# Patient Record
Sex: FEMALE | Race: WHITE | NOT HISPANIC OR LATINO | Employment: OTHER | URBAN - METROPOLITAN AREA
[De-identification: names, ages, dates, MRNs, and addresses within clinical notes are randomized per-mention and may not be internally consistent; named-entity substitution may affect disease eponyms.]

---

## 2022-06-17 ENCOUNTER — OFFICE VISIT (OUTPATIENT)
Dept: ORTHOPEDIC SURGERY | Facility: CLINIC | Age: 62
End: 2022-06-17

## 2022-06-17 VITALS — OXYGEN SATURATION: 98 % | HEART RATE: 97 BPM | HEIGHT: 63 IN | WEIGHT: 180 LBS | BODY MASS INDEX: 31.89 KG/M2

## 2022-06-17 DIAGNOSIS — M17.12 PRIMARY OSTEOARTHRITIS OF LEFT KNEE: Primary | ICD-10-CM

## 2022-06-17 PROCEDURE — 20610 DRAIN/INJ JOINT/BURSA W/O US: CPT | Performed by: ORTHOPAEDIC SURGERY

## 2022-06-17 PROCEDURE — 99203 OFFICE O/P NEW LOW 30 MIN: CPT | Performed by: ORTHOPAEDIC SURGERY

## 2022-06-17 RX ORDER — DICLOFENAC SODIUM 75 MG/1
75 TABLET, DELAYED RELEASE ORAL 2 TIMES DAILY
Qty: 60 TABLET | Refills: 2 | Status: SHIPPED | OUTPATIENT
Start: 2022-06-17 | End: 2022-08-22

## 2022-06-17 RX ORDER — AMOXICILLIN 875 MG/1
TABLET, COATED ORAL
COMMUNITY
Start: 2022-06-16 | End: 2023-02-14

## 2022-06-17 RX ADMIN — LIDOCAINE HYDROCHLORIDE 5 ML: 10 INJECTION, SOLUTION INFILTRATION; PERINEURAL at 10:18

## 2022-06-17 RX ADMIN — TRIAMCINOLONE ACETONIDE 40 MG: 40 INJECTION, SUSPENSION INTRA-ARTICULAR; INTRAMUSCULAR at 10:18

## 2022-06-17 NOTE — PROGRESS NOTES
"Chief Complaint  Initial Evaluation of the Left Knee     Subjective      Patricia Whyte presents to [facility] for evaluation of the left knee. The patient reports left knee pain since April without injury or trauma. She locates pain to the medial knee but she has had pain to the lateral knee. She admits to swelling, popping and grinding. She uses ice at night to be able to sleep. She has no other complaints.     No Known Allergies     Social History     Socioeconomic History   • Marital status:    Tobacco Use   • Smoking status: Current Every Day Smoker     Packs/day: 0.50   • Smokeless tobacco: Never Used        Review of Systems     Objective   Vital Signs:   Pulse 97   Ht 160 cm (63\")   Wt 81.6 kg (180 lb)   SpO2 98%   BMI 31.89 kg/m²       Physical Exam  Constitutional:       Appearance: Normal appearance. The patient is well-developed and normal weight.   HENT:      Head: Normocephalic.      Right Ear: Hearing and external ear normal.      Left Ear: Hearing and external ear normal.      Nose: Nose normal.   Eyes:      Conjunctiva/sclera: Conjunctivae normal.   Cardiovascular:      Rate and Rhythm: Normal rate.   Pulmonary:      Effort: Pulmonary effort is normal.      Breath sounds: No wheezing or rales.   Abdominal:      Palpations: Abdomen is soft.      Tenderness: There is no abdominal tenderness.   Musculoskeletal:      Cervical back: Normal range of motion.   Skin:     Findings: No rash.   Neurological:      Mental Status: The patient is alert and oriented to person, place, and time.   Psychiatric:         Mood and Affect: Mood and affect normal.         Judgment: Judgment normal.       Ortho Exam      Left knee- Stable to varus/valgus stress. Stable to anterior/posterior drawer. Tender to medial joint line. Mild swelling. Small effusion. Neurovascularly intact. ROM -5 to 130 degrees. Negative Lachman's. Mild pain with Brigido's. Positive EHL, FHL, GS and TA. " Sensation intact to all 5 nerves of the foot. Positive pulses. Good strength to hamstrings, quadriceps, dorsiflexors, and plantar flexors.  Calf soft.     Left knee: L knee  Date/Time: 6/17/2022 10:18 AM  Consent given by: patient  Site marked: site marked  Timeout: Immediately prior to procedure a time out was called to verify the correct patient, procedure, equipment, support staff and site/side marked as required   Supporting Documentation  Indications: pain   Procedure Details  Location: knee - L knee  Needle gauge: 21G.  Medications administered: 5 mL lidocaine 1 %; 40 mg triamcinolone acetonide 40 MG/ML  Patient tolerance: patient tolerated the procedure well with no immediate complications            Imaging Results (Most Recent)     None           Result Review :       XR Knee 3 View Left    Result Date: 5/18/2022  Narrative: PROCEDURE: XR KNEE 3 VW LEFT  COMPARISON: [facility], , KNEE 3 VIEWS LT, 6/14/2008, 15:35.  INDICATIONS: Left knee pain, swelling, popping X 5 weeks, NKI.  FINDINGS:  BONES: Moderate tricompartment osteoarthritic degenerative changes are present.  No fractures or acute osseous abnormalities are identified. SOFT TISSUES: Negative.  No visible soft tissue swelling.  EFFUSION: Small suprapatellar effusion. OTHER: Negative.       Impression:  Moderate tricompartment degenerative change.  Small suprapatellar effusion.      RAHEL CRUZ MD       Electronically Signed and Approved By: RAHEL CRUZ MD on 5/18/2022 at 15:37                      Assessment and Plan     Diagnoses and all orders for this visit:    1. Primary osteoarthritis of left knee (Primary)        Discussed the treatment plan with the patient.  I reviewed the x-rays that were previously obtained. Plan to continue ibuprofen. Discussed the risks and benefits of a left knee steroid injection. The patient expressed understanding and wished to proceed. She tolerated the injection well.     Call or return if  worsening symptoms.    Follow Up     6 weeks      Patient was given instructions and counseling regarding her condition or for health maintenance advice. Please see specific information pulled into the AVS if appropriate.     Scribed for Jhonathan Morales MD by Radha Almendarez.  06/17/22   10:03 EDT    I have personally performed the services described in this document as scribed by the above individual and it is both accurate and complete. Jhonathan Morales MD 06/20/22

## 2022-06-20 RX ORDER — LIDOCAINE HYDROCHLORIDE 10 MG/ML
5 INJECTION, SOLUTION INFILTRATION; PERINEURAL
Status: COMPLETED | OUTPATIENT
Start: 2022-06-17 | End: 2022-06-17

## 2022-06-20 RX ORDER — TRIAMCINOLONE ACETONIDE 40 MG/ML
40 INJECTION, SUSPENSION INTRA-ARTICULAR; INTRAMUSCULAR
Status: COMPLETED | OUTPATIENT
Start: 2022-06-17 | End: 2022-06-17

## 2022-07-29 ENCOUNTER — OFFICE VISIT (OUTPATIENT)
Dept: ORTHOPEDIC SURGERY | Facility: CLINIC | Age: 62
End: 2022-07-29

## 2022-07-29 VITALS — OXYGEN SATURATION: 97 % | HEIGHT: 63 IN | WEIGHT: 183 LBS | HEART RATE: 103 BPM | BODY MASS INDEX: 32.43 KG/M2

## 2022-07-29 DIAGNOSIS — M17.12 PRIMARY OSTEOARTHRITIS OF LEFT KNEE: Primary | ICD-10-CM

## 2022-07-29 PROCEDURE — 99213 OFFICE O/P EST LOW 20 MIN: CPT | Performed by: PHYSICIAN ASSISTANT

## 2022-07-29 NOTE — PROGRESS NOTES
"Chief Complaint  Pain and Follow-up of the Left Knee    Subjective          Patricia Whyte is a 62 y.o. female  presents to [facility] for   History of Present Illness      Patient presents for follow-up evaluation of left knee pain, left knee osteoarthritis.  She was seen by Dr. Morales on 6/17/2022 he gave her a left knee steroid injection which she states did help she has also been taking diclofenac with relief.  She works a job where she is on her feet all day and states that her knee is not 100% better but she states the injection has helped a lot.  She has tried other treatment options including medications, bracing and the injection plus diclofenac have helped her the most.  No new complaints today.      No Known Allergies     Social History     Socioeconomic History   • Marital status:    Tobacco Use   • Smoking status: Current Every Day Smoker     Packs/day: 0.50   • Smokeless tobacco: Never Used   Vaping Use   • Vaping Use: Never used        REVIEW OF SYSTEMS    Constitutional: Denies fevers, chills, weight loss  Cardiovascular: Denies chest pain, shortness of breath  Skin: Denies rashes, acute skin changes  Neurologic: Denies headache, loss of consciousness  MSK: Left knee pain      Objective   Vital Signs:   Pulse 103   Ht 160 cm (63\")   Wt 83 kg (183 lb)   SpO2 97%   BMI 32.42 kg/m²     Body mass index is 32.42 kg/m².    Physical Exam    Left knee: Skin is intact, no erythema, no ecchymosis, no swelling, no signs of infection, full extension, flexion 120, stable to varus/valgus stress, stable anterior/posterior drawer, nontender to palpation, no pain with range of motion, 5 out of 5 strength.    Procedures    Imaging Results (Most Recent)     None           Result Review :   The following data was reviewed by: NICOLÁS Holloway on 07/29/2022:               Assessment and Plan    Diagnoses and all orders for this visit:    1. Primary " osteoarthritis of left knee (Primary)        Discussed diagnosis and treatment options with the patient patient was advised to continue activity as tolerated we discussed future injections which she would like to follow-up in 6 weeks we are going to seek approval for Zilretta injection for the left knee in 6 weeks for, if not we will do regular steroid injections.  Patient agreed with plan.  She will continue diclofenac.    Call or return if worsening symptoms.    Follow Up   Return in about 6 weeks (around 9/9/2022) for Recheck.  Patient was given instructions and counseling regarding her condition or for health maintenance advice. Please see specific information pulled into the AVS if appropriate.

## 2022-08-22 DIAGNOSIS — M17.12 PRIMARY OSTEOARTHRITIS OF LEFT KNEE: ICD-10-CM

## 2022-08-22 RX ORDER — DICLOFENAC SODIUM 75 MG/1
TABLET, DELAYED RELEASE ORAL
Qty: 60 TABLET | Refills: 2 | Status: SHIPPED | OUTPATIENT
Start: 2022-08-22 | End: 2022-09-09 | Stop reason: SDUPTHER

## 2022-09-09 ENCOUNTER — TELEPHONE (OUTPATIENT)
Dept: ORTHOPEDIC SURGERY | Facility: CLINIC | Age: 62
End: 2022-09-09

## 2022-09-09 ENCOUNTER — OFFICE VISIT (OUTPATIENT)
Dept: ORTHOPEDIC SURGERY | Facility: CLINIC | Age: 62
End: 2022-09-09

## 2022-09-09 VITALS — OXYGEN SATURATION: 95 % | WEIGHT: 185.4 LBS | BODY MASS INDEX: 32.85 KG/M2 | HEIGHT: 63 IN | HEART RATE: 92 BPM

## 2022-09-09 DIAGNOSIS — M17.12 PRIMARY OSTEOARTHRITIS OF LEFT KNEE: Primary | ICD-10-CM

## 2022-09-09 PROCEDURE — 20610 DRAIN/INJ JOINT/BURSA W/O US: CPT | Performed by: PHYSICIAN ASSISTANT

## 2022-09-09 RX ORDER — TRIAMCINOLONE ACETONIDE 40 MG/ML
40 INJECTION, SUSPENSION INTRA-ARTICULAR; INTRAMUSCULAR
Status: COMPLETED | OUTPATIENT
Start: 2022-09-09 | End: 2022-09-09

## 2022-09-09 RX ORDER — LIDOCAINE HYDROCHLORIDE 10 MG/ML
5 INJECTION, SOLUTION INFILTRATION; PERINEURAL
Status: COMPLETED | OUTPATIENT
Start: 2022-09-09 | End: 2022-09-09

## 2022-09-09 RX ORDER — DICLOFENAC SODIUM 75 MG/1
75 TABLET, DELAYED RELEASE ORAL 2 TIMES DAILY
Qty: 60 TABLET | Refills: 2 | Status: SHIPPED | OUTPATIENT
Start: 2022-09-09 | End: 2023-03-16 | Stop reason: SDUPTHER

## 2022-09-09 RX ADMIN — TRIAMCINOLONE ACETONIDE 40 MG: 40 INJECTION, SUSPENSION INTRA-ARTICULAR; INTRAMUSCULAR at 11:21

## 2022-09-09 RX ADMIN — LIDOCAINE HYDROCHLORIDE 5 ML: 10 INJECTION, SOLUTION INFILTRATION; PERINEURAL at 11:21

## 2022-09-09 NOTE — TELEPHONE ENCOUNTER
Patient saw Von today. Needs a PA for Left knee zilretta in 3 months.   
Will work on prior auth, next appt in 3 months  
Statement Selected

## 2022-12-15 ENCOUNTER — OFFICE VISIT (OUTPATIENT)
Dept: ORTHOPEDIC SURGERY | Facility: CLINIC | Age: 62
End: 2022-12-15

## 2022-12-15 VITALS — HEART RATE: 91 BPM | HEIGHT: 63 IN | OXYGEN SATURATION: 95 % | BODY MASS INDEX: 32.43 KG/M2 | WEIGHT: 183 LBS

## 2022-12-15 DIAGNOSIS — M17.12 PRIMARY OSTEOARTHRITIS OF LEFT KNEE: Primary | ICD-10-CM

## 2022-12-15 PROCEDURE — 20610 DRAIN/INJ JOINT/BURSA W/O US: CPT | Performed by: PHYSICIAN ASSISTANT

## 2022-12-15 RX ORDER — HYDROCODONE BITARTRATE AND ACETAMINOPHEN 5; 325 MG/1; MG/1
TABLET ORAL
COMMUNITY
Start: 2022-12-14 | End: 2023-02-14

## 2022-12-15 RX ORDER — LIDOCAINE HYDROCHLORIDE 10 MG/ML
5 INJECTION, SOLUTION INFILTRATION; PERINEURAL
Status: COMPLETED | OUTPATIENT
Start: 2022-12-15 | End: 2022-12-15

## 2022-12-15 RX ADMIN — LIDOCAINE HYDROCHLORIDE 5 ML: 10 INJECTION, SOLUTION INFILTRATION; PERINEURAL at 10:29

## 2022-12-15 NOTE — PROGRESS NOTES
"Chief Complaint  Pain and Follow-up of the Left Knee    Subjective          Patricia Whyte is a 62 y.o. female  presents to [facility] for   History of Present Illness      For follow-up evaluation of left knee pain, left knee osteoarthritis.  She points the anterior knee in the medial and lateral knee as her areas of pain states pain is similar to past episodes she takes diclofenac as needed and Tylenol as needed, she states the injections she has been receiving have been helping her knee pain she states they are some days worse than others but in general she is happy with her results and would like to continue conservative treatment with left knee steroid injection today.      No Known Allergies     Social History     Socioeconomic History   • Marital status:    Tobacco Use   • Smoking status: Every Day     Packs/day: 0.50     Types: Cigarettes   • Smokeless tobacco: Never   Vaping Use   • Vaping Use: Never used        REVIEW OF SYSTEMS    Constitutional: Denies fevers, chills, weight loss  Cardiovascular: Denies chest pain, shortness of breath  Skin: Denies rashes, acute skin changes  Neurologic: Denies headache, loss of consciousness  MSK: Left knee pain      Objective   Vital Signs:   Pulse 91   Ht 160 cm (63\")   Wt 83 kg (183 lb)   SpO2 95%   BMI 32.42 kg/m²     Body mass index is 32.42 kg/m².    Physical Exam    Left knee: Skin is intact, no erythema, ecchymosis, no swelling, no effusion, no signs of infection, full extension, flexion 120, stable anterior/posterior drawer, stable to varus/valgus stress.  Nontender to palpation, no pain with range of motion.    Large Joint Arthrocentesis: L knee  Date/Time: 12/15/2022 10:29 AM  Consent given by: patient  Site marked: site marked  Timeout: Immediately prior to procedure a time out was called to verify the correct patient, procedure, equipment, support staff and site/side marked as required   Supporting " Documentation  Indications: pain   Procedure Details  Location: knee - L knee  Needle gauge: 21 G.  Medications administered: 32 mg Triamcinolone Acetonide 32 MG; 5 mL lidocaine 1 %  Patient tolerance: patient tolerated the procedure well with no immediate complications          Imaging Results (Most Recent)     None           Result Review :   The following data was reviewed by: NICOLÁS Holloway on 12/15/2022:               Assessment and Plan    Diagnoses and all orders for this visit:    1. Primary osteoarthritis of left knee (Primary)    Other orders  -     Large Joint Arthrocentesis: L knee        Discussed diagnosis and treatment options with the patient she elected to have left knee steroid injection today which she tolerated well follow-up in 3 months.    Call or return if worsening symptoms.    Follow Up   Return in about 3 months (around 3/15/2023) for Recheck.  Patient was given instructions and counseling regarding her condition or for health maintenance advice. Please see specific information pulled into the AVS if appropriate.

## 2023-03-16 ENCOUNTER — OFFICE VISIT (OUTPATIENT)
Dept: ORTHOPEDIC SURGERY | Facility: CLINIC | Age: 63
End: 2023-03-16
Payer: COMMERCIAL

## 2023-03-16 VITALS — HEIGHT: 63 IN | OXYGEN SATURATION: 97 % | HEART RATE: 101 BPM | WEIGHT: 180 LBS | BODY MASS INDEX: 31.89 KG/M2

## 2023-03-16 DIAGNOSIS — M17.12 PRIMARY OSTEOARTHRITIS OF LEFT KNEE: Primary | ICD-10-CM

## 2023-03-16 PROCEDURE — 20610 DRAIN/INJ JOINT/BURSA W/O US: CPT | Performed by: PHYSICIAN ASSISTANT

## 2023-03-16 RX ORDER — METHYLPREDNISOLONE ACETATE 80 MG/ML
80 INJECTION, SUSPENSION INTRA-ARTICULAR; INTRALESIONAL; INTRAMUSCULAR; SOFT TISSUE
Status: COMPLETED | OUTPATIENT
Start: 2023-03-16 | End: 2023-03-16

## 2023-03-16 RX ORDER — LIDOCAINE HYDROCHLORIDE 10 MG/ML
5 INJECTION, SOLUTION INFILTRATION; PERINEURAL
Status: COMPLETED | OUTPATIENT
Start: 2023-03-16 | End: 2023-03-16

## 2023-03-16 RX ORDER — GABAPENTIN 300 MG/1
300 CAPSULE ORAL
COMMUNITY
Start: 2023-02-27

## 2023-03-16 RX ORDER — DICLOFENAC SODIUM 75 MG/1
75 TABLET, DELAYED RELEASE ORAL 2 TIMES DAILY
Qty: 60 TABLET | Refills: 2 | Status: SHIPPED | OUTPATIENT
Start: 2023-03-16

## 2023-03-16 RX ADMIN — METHYLPREDNISOLONE ACETATE 80 MG: 80 INJECTION, SUSPENSION INTRA-ARTICULAR; INTRALESIONAL; INTRAMUSCULAR; SOFT TISSUE at 10:30

## 2023-03-16 RX ADMIN — LIDOCAINE HYDROCHLORIDE 5 ML: 10 INJECTION, SOLUTION INFILTRATION; PERINEURAL at 10:30

## 2023-03-16 NOTE — PROGRESS NOTES
"Chief Complaint  Follow-up and Pain of the Left Knee    Subjective          History of Present Illness      Patricia Whyte is a 63 y.o. female  presents to [facility] for     Patient presents for follow-up evaluation of left knee pain, left knee osteoarthritis.  She states her knee pain has been coming back over the last week.  She states the injections do last almost the full 3 months and she would like to continue conservative treatment while they do help.  She states she is about to work as a  for [employer] and some of the suites at the [employer] and she is stating she would like to have an injection prior to derby occurring.  She also takes diclofenac and is requesting a refill of that.  She denies locking catching buckling she does admit to some swelling with activity.  She also uses a sleeve for knee compression and stability      No Known Allergies     Social History     Socioeconomic History   • Marital status:    Tobacco Use   • Smoking status: Every Day     Packs/day: 0.50     Types: Cigarettes   • Smokeless tobacco: Never   Vaping Use   • Vaping Use: Never used   Substance and Sexual Activity   • Alcohol use: Not Currently   • Drug use: Defer   • Sexual activity: Defer        REVIEW OF SYSTEMS    Constitutional: Denies fevers, chills, weight loss  Cardiovascular: Denies chest pain, shortness of breath  Skin: Denies rashes, acute skin changes  Neurologic: Denies headache, loss of consciousness  MSK: Left knee pain      Objective   Vital Signs:   Pulse 101   Ht 160 cm (63\")   Wt 81.6 kg (180 lb)   SpO2 97%   BMI 31.89 kg/m²     Body mass index is 31.89 kg/m².    Physical Exam    Left knee: Skin is intact, no erythema, no ecchymosis, no swelling, no effusion, no signs of infection, full extension, flexion 120, stable anterior/posterior drawer, stable to varus/valgus stress.  Mild tenderness to palpation of anterior medial and lateral knee along the " joint line, no pain with range of motion. Negative Brigido, Negative Lachman.    Large Joint Arthrocentesis: L knee  Date/Time: 3/16/2023 10:30 AM  Consent given by: patient  Site marked: site marked  Timeout: Immediately prior to procedure a time out was called to verify the correct patient, procedure, equipment, support staff and site/side marked as required   Supporting Documentation  Indications: pain   Procedure Details  Location: knee - L knee  Needle gauge: 21 G.  Medications administered: 80 mg methylPREDNISolone acetate 80 MG/ML; 5 mL lidocaine 1 %  Patient tolerance: patient tolerated the procedure well with no immediate complications          Imaging Results (Most Recent)     None           Result Review :   The following data was reviewed by: NICOLÁS Holloway on 03/16/2023:               Assessment and Plan    Diagnoses and all orders for this visit:    1. Primary osteoarthritis of left knee (Primary)    Other orders  -     Large Joint Arthrocentesis: L knee  -     diclofenac (VOLTAREN) 75 MG EC tablet; Take 1 tablet by mouth 2 (Two) Times a Day.  Dispense: 60 tablet; Refill: 2        Discussed diagnosis and treatment options with the patient she elected to have left knee steroid injection which she tolerated well, continue weightbearing and activity as tolerated she is given refill of diclofenac follow-up in 3 months.    Call or return if worsening symptoms.    Follow Up   Return in about 3 months (around 6/16/2023) for Recheck.  Patient was given instructions and counseling regarding her condition or for health maintenance advice. Please see specific information pulled into the AVS if appropriate.

## 2023-03-30 ENCOUNTER — TELEPHONE (OUTPATIENT)
Dept: ORTHOPEDIC SURGERY | Facility: CLINIC | Age: 63
End: 2023-03-30

## 2023-03-30 NOTE — TELEPHONE ENCOUNTER
Caller: LILLY ESCOBAR    Relationship to patient: SELF    Best call back number: [phone]    Chief complaint: LEFT KNEE    Type of visit: DOREEN INJ    Requested date: ASAP         Additional notes:PATIENT STATES HER LAST INJECTION WAS STEROID AND HER KNEE IS HURTING AGAIN ALREADY. STATES SHE WANTS CORTISONE INJECTION?

## 2023-04-20 ENCOUNTER — OFFICE VISIT (OUTPATIENT)
Dept: ORTHOPEDIC SURGERY | Facility: CLINIC | Age: 63
End: 2023-04-20
Payer: COMMERCIAL

## 2023-04-20 VITALS — WEIGHT: 187.6 LBS | HEIGHT: 63 IN | HEART RATE: 80 BPM | OXYGEN SATURATION: 98 % | BODY MASS INDEX: 33.24 KG/M2

## 2023-04-20 DIAGNOSIS — M17.12 PRIMARY OSTEOARTHRITIS OF LEFT KNEE: Primary | ICD-10-CM

## 2023-04-20 RX ADMIN — TRIAMCINOLONE ACETONIDE 40 MG: 40 INJECTION, SUSPENSION INTRA-ARTICULAR; INTRAMUSCULAR at 11:15

## 2023-04-20 RX ADMIN — LIDOCAINE HYDROCHLORIDE 5 ML: 10 INJECTION, SOLUTION EPIDURAL; INFILTRATION; INTRACAUDAL; PERINEURAL at 11:15

## 2023-04-20 NOTE — PROGRESS NOTES
"Chief Complaint  Follow-up of the Left Knee     Subjective      Patricia Whyte presents to [facility] for follow up evaluation of the left knee. The patient has been treating her left knee osteoarthritis conservatively. She has had steroid injections in the past with relief. She had a depo injection without relief. She states she is about to work as a  for [employer] and some of the suites at the [employer] and she is stating she would like to have an injection prior to derby occurring.     No Known Allergies     Social History     Socioeconomic History   • Marital status:    Tobacco Use   • Smoking status: Every Day     Packs/day: 0.50     Types: Cigarettes   • Smokeless tobacco: Never   Vaping Use   • Vaping Use: Never used   Substance and Sexual Activity   • Alcohol use: Not Currently   • Drug use: Defer   • Sexual activity: Defer        Review of Systems     Objective   Vital Signs:   Pulse 80   Ht 160 cm (63\")   Wt 85.1 kg (187 lb 9.6 oz)   SpO2 98%   BMI 33.23 kg/m²       Physical Exam  Constitutional:       Appearance: Normal appearance. The patient is well-developed and normal weight.   HENT:      Head: Normocephalic.      Right Ear: Hearing and external ear normal.      Left Ear: Hearing and external ear normal.      Nose: Nose normal.   Eyes:      Conjunctiva/sclera: Conjunctivae normal.   Cardiovascular:      Rate and Rhythm: Normal rate.   Pulmonary:      Effort: Pulmonary effort is normal.      Breath sounds: No wheezing or rales.   Abdominal:      Palpations: Abdomen is soft.      Tenderness: There is no abdominal tenderness.   Musculoskeletal:      Cervical back: Normal range of motion.   Skin:     Findings: No rash.   Neurological:      Mental Status: The patient is alert and oriented to person, place, and time.   Psychiatric:         Mood and Affect: Mood and affect normal.         Judgment: Judgment normal.       Ortho Exam      Left knee: " Skin is intact, no erythema, no ecchymosis, no swelling, no effusion, no signs of infection, full extension, flexion 120, stable anterior/posterior drawer, stable to varus/valgus stress.  Mild tenderness to palpation of anterior medial and lateral knee along the joint line, no pain with range of motion. Negative Brigido, Negative Lachman.    Left knee : L knee  Date/Time: 4/20/2023 11:15 AM  Consent given by: patient  Site marked: site marked  Timeout: Immediately prior to procedure a time out was called to verify the correct patient, procedure, equipment, support staff and site/side marked as required   Supporting Documentation  Indications: pain   Procedure Details  Location: knee - L knee  Needle gauge: 21G.  Medications administered: 40 mg triamcinolone acetonide 40 MG/ML; 5 mL lidocaine PF 1% 1 %  Patient tolerance: patient tolerated the procedure well with no immediate complications          Imaging Results (Most Recent)     None           Result Review :       No results found.           Assessment and Plan     Diagnoses and all orders for this visit:    1. Primary osteoarthritis of left knee (Primary)        Discussed the treatment plan with the patient.  Discussed the risks and benefits of a left knee Kenalog injection. The patient expressed understanding and wished to proceed. She tolerated the injection well.     Educated on risk of smoking. Discussed options for smoking cessation. and Call or return if worsening symptoms.    Follow Up     PRN      Patient was given instructions and counseling regarding her condition or for health maintenance advice. Please see specific information pulled into the AVS if appropriate.     Scribed for Jhonathan Morales MD by Radha Almendarez.  04/20/23   11:01 EDT    I have personally performed the services described in this document as scribed by the above individual and it is both accurate and complete. Jhonathan Morales MD 04/21/23

## 2023-04-21 RX ORDER — LIDOCAINE HYDROCHLORIDE 10 MG/ML
5 INJECTION, SOLUTION EPIDURAL; INFILTRATION; INTRACAUDAL; PERINEURAL
Status: COMPLETED | OUTPATIENT
Start: 2023-04-20 | End: 2023-04-20

## 2023-04-21 RX ORDER — TRIAMCINOLONE ACETONIDE 40 MG/ML
40 INJECTION, SUSPENSION INTRA-ARTICULAR; INTRAMUSCULAR
Status: COMPLETED | OUTPATIENT
Start: 2023-04-20 | End: 2023-04-20

## 2023-10-20 ENCOUNTER — OFFICE VISIT (OUTPATIENT)
Dept: ORTHOPEDIC SURGERY | Facility: CLINIC | Age: 63
End: 2023-10-20
Payer: COMMERCIAL

## 2023-10-20 VITALS
OXYGEN SATURATION: 94 % | HEIGHT: 63 IN | HEART RATE: 98 BPM | BODY MASS INDEX: 32.07 KG/M2 | WEIGHT: 181 LBS | DIASTOLIC BLOOD PRESSURE: 86 MMHG | SYSTOLIC BLOOD PRESSURE: 135 MMHG

## 2023-10-20 DIAGNOSIS — M17.12 PRIMARY OSTEOARTHRITIS OF LEFT KNEE: Primary | ICD-10-CM

## 2023-10-20 RX ORDER — DICLOFENAC SODIUM 75 MG/1
75 TABLET, DELAYED RELEASE ORAL 2 TIMES DAILY
Qty: 60 TABLET | Refills: 2 | Status: SHIPPED | OUTPATIENT
Start: 2023-10-20

## 2023-10-20 RX ADMIN — TRIAMCINOLONE ACETONIDE 40 MG: 40 INJECTION, SUSPENSION INTRA-ARTICULAR; INTRAMUSCULAR at 11:36

## 2023-10-20 RX ADMIN — LIDOCAINE HYDROCHLORIDE 5 ML: 10 INJECTION, SOLUTION INFILTRATION; PERINEURAL at 11:36

## 2023-10-20 NOTE — PROGRESS NOTES
"Chief Complaint  Pain and Follow-up of the Left Knee     Subjective      Patricia Whyte presents to [facility] for follow up evaluation of the left ankle. The patient has been treating her left knee osteoarthritis conservatively. She has had steroid injections in the past with relief.      No Known Allergies     Social History     Socioeconomic History    Marital status:    Tobacco Use    Smoking status: Every Day     Packs/day: .5     Types: Cigarettes    Smokeless tobacco: Never   Vaping Use    Vaping Use: Never used   Substance and Sexual Activity    Alcohol use: Not Currently    Drug use: Never    Sexual activity: Defer        I reviewed the patient's chief complaint, history of present illness, review of systems, past medical history, surgical history, family history, social history, medications, and allergy list.     Review of Systems     Constitutional: Denies fevers, chills, weight loss  Cardiovascular: Denies chest pain, shortness of breath  Skin: Denies rashes, acute skin changes  Neurologic: Denies headache, loss of consciousness  MSK: Left knee pain      Vital Signs:   /86   Pulse 98   Ht 160 cm (63\")   Wt 82.1 kg (181 lb)   SpO2 94%   BMI 32.06 kg/m²          Physical Exam  General: Alert. No acute distress    Ortho Exam      Left knee: Skin is intact, no erythema, no ecchymosis, no swelling, no effusion, no signs of infection, full extension, flexion 120, stable anterior/posterior drawer, stable to varus/valgus stress.  Mild tenderness to palpation of anterior medial and lateral knee along the joint line, no pain with range of motion. Negative Brigido, Negative Lachman.      Left knee: L knee  Date/Time: 10/20/2023 11:36 AM  Consent given by: patient  Site marked: site marked  Timeout: Immediately prior to procedure a time out was called to verify the correct patient, procedure, equipment, support staff and site/side marked as required   Supporting " Documentation  Indications: pain   Procedure Details  Location: knee - L knee  Needle gauge: 21G.  Medications administered: 5 mL lidocaine 1 %; 40 mg triamcinolone acetonide 40 MG/ML  Patient tolerance: patient tolerated the procedure well with no immediate complications          X-Ray Report:  Left knee X-Ray  Indication: Evaluation of left knee pain  AP/Lateral, Standing, and Rand view(s)  Findings: advanced degenerative changes. Medial joint space narrowing. No acute fracture   Prior studies available for comparison: no       Imaging Results (Most Recent)       Procedure Component Value Units Date/Time    XR Knee 3 View Left [[id number]] Resulted: 10/20/23 1053     Updated: 10/20/23 1055             Result Review :       No results found.           Assessment and Plan     Diagnoses and all orders for this visit:    1. Primary osteoarthritis of left knee (Primary)  -     XR Knee 3 View Left        Discussed the treatment plan with the patient. Discussed the risks and benefits of a left knee Kenalog injection. The patient expressed understanding and wished to proceed. She tolerated the injection well.     Call or return if worsening symptoms.    Follow Up     3 months      Patient was given instructions and counseling regarding her condition or for health maintenance advice. Please see specific information pulled into the AVS if appropriate.     Scribed for Jhonathan Morales MD by Radha Almendarez.  10/20/23   11:15 EDT    I have personally performed the services described in this document as scribed by the above individual and it is both accurate and complete. Jhonathan Morales MD 10/21/23

## 2023-10-21 RX ORDER — LIDOCAINE HYDROCHLORIDE 10 MG/ML
5 INJECTION, SOLUTION INFILTRATION; PERINEURAL
Status: COMPLETED | OUTPATIENT
Start: 2023-10-20 | End: 2023-10-20

## 2023-10-21 RX ORDER — TRIAMCINOLONE ACETONIDE 40 MG/ML
40 INJECTION, SUSPENSION INTRA-ARTICULAR; INTRAMUSCULAR
Status: COMPLETED | OUTPATIENT
Start: 2023-10-20 | End: 2023-10-20

## 2024-01-25 ENCOUNTER — OFFICE VISIT (OUTPATIENT)
Dept: ORTHOPEDIC SURGERY | Facility: CLINIC | Age: 64
End: 2024-01-25
Payer: COMMERCIAL

## 2024-01-25 VITALS
SYSTOLIC BLOOD PRESSURE: 139 MMHG | OXYGEN SATURATION: 94 % | HEIGHT: 63 IN | HEART RATE: 95 BPM | BODY MASS INDEX: 31.52 KG/M2 | DIASTOLIC BLOOD PRESSURE: 84 MMHG | WEIGHT: 177.91 LBS

## 2024-01-25 DIAGNOSIS — M17.12 PRIMARY OSTEOARTHRITIS OF LEFT KNEE: Primary | ICD-10-CM

## 2024-01-25 RX ORDER — LIDOCAINE HYDROCHLORIDE 20 MG/ML
JELLY TOPICAL
COMMUNITY
Start: 2024-01-03

## 2024-01-25 RX ORDER — DICYCLOMINE HCL 20 MG
1 TABLET ORAL 3 TIMES DAILY
COMMUNITY
Start: 2024-01-22

## 2024-01-25 RX ORDER — TRIAMCINOLONE ACETONIDE 40 MG/ML
40 INJECTION, SUSPENSION INTRA-ARTICULAR; INTRAMUSCULAR
Status: COMPLETED | OUTPATIENT
Start: 2024-01-25 | End: 2024-01-25

## 2024-01-25 RX ADMIN — TRIAMCINOLONE ACETONIDE 40 MG: 40 INJECTION, SUSPENSION INTRA-ARTICULAR; INTRAMUSCULAR at 10:31

## 2024-01-25 NOTE — PROGRESS NOTES
"Chief Complaint  Pain and Follow-up of the Left Knee    Subjective          Pain        Patricia Whyte is a 64 y.o. female  presents to [facility] for     Patient presents for follow-up evaluation of left knee pain, left knee osteoarthritis.  Patient works on her feet all day she last received an injection in October she states the injection wore off about 2 to 3 weeks ago.  She points anterior medial and lateral knee as her areas of pain.  She notes she is a candidate for knee replacement she would like to avoid this and continue conservative treatment she is requesting left knee steroid injection today.    No Known Allergies     Social History     Socioeconomic History    Marital status:    Tobacco Use    Smoking status: Every Day     Packs/day: .5     Types: Cigarettes    Smokeless tobacco: Never   Vaping Use    Vaping Use: Never used   Substance and Sexual Activity    Alcohol use: Not Currently    Drug use: Never    Sexual activity: Defer        REVIEW OF SYSTEMS    Constitutional: Awake alert and oriented x3, no acute distress, denies fevers, chills, weight loss  Respiratory: No respiratory distress  Vascular: Brisk cap refill, Intact distal pulses, No cyanosis, compartments soft with no signs or symptoms of compartment syndrome or DVT.   Cardiovascular: Denies chest pain, shortness of breath  Skin: Denies rashes, acute skin changes  Neurologic: Denies headache, loss of consciousness  MSK: Left knee pain      Objective   Vital Signs:   /84   Pulse 95   Ht 160 cm (63\")   Wt 80.7 kg (177 lb 14.6 oz)   SpO2 94%   BMI 31.52 kg/m²     Body mass index is 31.52 kg/m².    Physical Exam       Left knee: Skin is intact, no erythema, no ecchymosis, no swelling, no effusion, no signs of infection, full extension, flexion 120, stable anterior/posterior drawer, stable to varus/valgus stress.  Mild tenderness to palpation of anterior medial and lateral knee along the joint " line, no pain with range of motion. Negative Brigido, Negative Lachman.         Large Joint Arthrocentesis: L knee  Date/Time: 1/25/2024 10:31 AM  Consent given by: patient  Site marked: site marked  Timeout: Immediately prior to procedure a time out was called to verify the correct patient, procedure, equipment, support staff and site/side marked as required   Supporting Documentation  Indications: pain   Procedure Details  Location: knee - L knee  Preparation: Patient was prepped and draped in the usual sterile fashion  Needle gauge: 21 G.  Approach: lateral  Medications administered: 40 mg triamcinolone acetonide 40 MG/ML  Patient tolerance: patient tolerated the procedure well with no immediate complications        Imaging Results (Most Recent)       None             Result Review :   The following data was reviewed by: NICOLÁS Holloway on 01/25/2024:               Assessment and Plan    Diagnoses and all orders for this visit:    1. Primary osteoarthritis of left knee (Primary)    Other orders  -     Large Joint Arthrocentesis: L knee        Discussed diagnosis and treatment options with the patient she was advised she can receive left knee steroid injection which she tolerated well follow-up in 3 months, she may want to be seen for the right knee in the near future    Call or return if worsening symptoms.    Follow Up   Return in about 3 months (around 4/25/2024) for Recheck.  Patient was given instructions and counseling regarding her condition or for health maintenance advice. Please see specific information pulled into the AVS if appropriate.       EMR Dragon/Transcription disclaimer:  Much of this encounter note is an electronic transcription/translation of spoken language to printed text, aka voice recognition.  The electronic translation of spoken language may permit erroneous or at times nonsensical words or phrases to be inadvertently transcribed; although I have reviewed the note for such  errors, some may still exist so please interpret based on surrounding text content.

## 2024-02-15 ENCOUNTER — OFFICE (AMBULATORY)
Dept: URBAN - METROPOLITAN AREA CLINIC 5 | Facility: CLINIC | Age: 64
End: 2024-02-15

## 2024-02-15 VITALS
SYSTOLIC BLOOD PRESSURE: 143 MMHG | OXYGEN SATURATION: 97 % | WEIGHT: 180 LBS | HEART RATE: 102 BPM | HEIGHT: 63 IN | DIASTOLIC BLOOD PRESSURE: 82 MMHG

## 2024-02-15 DIAGNOSIS — R19.4 CHANGE IN BOWEL HABIT: ICD-10-CM

## 2024-02-15 DIAGNOSIS — K62.89 OTHER SPECIFIED DISEASES OF ANUS AND RECTUM: ICD-10-CM

## 2024-02-15 DIAGNOSIS — K57.32 DIVERTICULITIS OF LARGE INTESTINE WITHOUT PERFORATION OR ABS: ICD-10-CM

## 2024-02-15 PROCEDURE — 99204 OFFICE O/P NEW MOD 45 MIN: CPT | Performed by: INTERNAL MEDICINE

## 2024-02-19 VITALS
DIASTOLIC BLOOD PRESSURE: 71 MMHG | HEART RATE: 89 BPM | DIASTOLIC BLOOD PRESSURE: 68 MMHG | RESPIRATION RATE: 14 BRPM | RESPIRATION RATE: 15 BRPM | OXYGEN SATURATION: 96 % | OXYGEN SATURATION: 93 % | SYSTOLIC BLOOD PRESSURE: 120 MMHG | SYSTOLIC BLOOD PRESSURE: 119 MMHG | TEMPERATURE: 97.7 F | RESPIRATION RATE: 17 BRPM | RESPIRATION RATE: 19 BRPM | SYSTOLIC BLOOD PRESSURE: 126 MMHG | DIASTOLIC BLOOD PRESSURE: 67 MMHG | HEART RATE: 83 BPM | SYSTOLIC BLOOD PRESSURE: 121 MMHG | HEART RATE: 92 BPM | OXYGEN SATURATION: 94 % | TEMPERATURE: 97.3 F | HEART RATE: 94 BPM | OXYGEN SATURATION: 97 % | HEART RATE: 86 BPM | DIASTOLIC BLOOD PRESSURE: 73 MMHG | DIASTOLIC BLOOD PRESSURE: 69 MMHG | DIASTOLIC BLOOD PRESSURE: 72 MMHG | RESPIRATION RATE: 16 BRPM | HEART RATE: 101 BPM | OXYGEN SATURATION: 100 % | WEIGHT: 175 LBS | RESPIRATION RATE: 21 BRPM | DIASTOLIC BLOOD PRESSURE: 88 MMHG | HEART RATE: 99 BPM | DIASTOLIC BLOOD PRESSURE: 74 MMHG | HEART RATE: 79 BPM | RESPIRATION RATE: 18 BRPM | OXYGEN SATURATION: 95 % | DIASTOLIC BLOOD PRESSURE: 82 MMHG | SYSTOLIC BLOOD PRESSURE: 154 MMHG | SYSTOLIC BLOOD PRESSURE: 107 MMHG | SYSTOLIC BLOOD PRESSURE: 118 MMHG | SYSTOLIC BLOOD PRESSURE: 127 MMHG | SYSTOLIC BLOOD PRESSURE: 111 MMHG | SYSTOLIC BLOOD PRESSURE: 117 MMHG | HEART RATE: 91 BPM | SYSTOLIC BLOOD PRESSURE: 145 MMHG | DIASTOLIC BLOOD PRESSURE: 101 MMHG | HEART RATE: 95 BPM | HEIGHT: 63 IN | DIASTOLIC BLOOD PRESSURE: 70 MMHG | OXYGEN SATURATION: 99 %

## 2024-02-21 ENCOUNTER — OFFICE (AMBULATORY)
Dept: URBAN - METROPOLITAN AREA PATHOLOGY 4 | Facility: PATHOLOGY | Age: 64
End: 2024-02-21

## 2024-02-21 ENCOUNTER — AMBULATORY SURGICAL CENTER (AMBULATORY)
Dept: URBAN - METROPOLITAN AREA SURGERY 17 | Facility: SURGERY | Age: 64
End: 2024-02-21

## 2024-02-21 DIAGNOSIS — K62.1 RECTAL POLYP: ICD-10-CM

## 2024-02-21 DIAGNOSIS — R19.4 CHANGE IN BOWEL HABIT: ICD-10-CM

## 2024-02-21 DIAGNOSIS — D12.0 BENIGN NEOPLASM OF CECUM: ICD-10-CM

## 2024-02-21 DIAGNOSIS — D12.2 BENIGN NEOPLASM OF ASCENDING COLON: ICD-10-CM

## 2024-02-21 DIAGNOSIS — K57.30 DIVERTICULOSIS OF LARGE INTESTINE WITHOUT PERFORATION OR ABS: ICD-10-CM

## 2024-02-21 DIAGNOSIS — K62.7 RADIATION PROCTITIS: ICD-10-CM

## 2024-02-21 DIAGNOSIS — K57.32 DIVERTICULITIS OF LARGE INTESTINE WITHOUT PERFORATION OR ABS: ICD-10-CM

## 2024-02-21 PROBLEM — K63.89 OTHER SPECIFIED DISEASES OF INTESTINE: Status: ACTIVE | Noted: 2024-02-21

## 2024-02-21 PROBLEM — K63.5 POLYP OF COLON: Status: ACTIVE | Noted: 2024-02-21

## 2024-02-21 LAB
GI HISTOLOGY: A. CECUM: (no result)
GI HISTOLOGY: B. PROXIMAL ASCENDING COLON: (no result)
GI HISTOLOGY: D. MID-ASCENDING COLON: (no result)
GI HISTOLOGY: F. RECTUM: (no result)
GI HISTOLOGY: PDF REPORT: (no result)
Lab: (no result)
Lab: (no result)

## 2024-02-21 PROCEDURE — 88305 TISSUE EXAM BY PATHOLOGIST: CPT | Performed by: PATHOLOGY

## 2024-02-21 PROCEDURE — 45380 COLONOSCOPY AND BIOPSY: CPT | Mod: 59 | Performed by: INTERNAL MEDICINE

## 2024-02-21 PROCEDURE — 45385 COLONOSCOPY W/LESION REMOVAL: CPT | Performed by: INTERNAL MEDICINE

## 2024-03-25 VITALS
HEIGHT: 63 IN | WEIGHT: 178 LBS | OXYGEN SATURATION: 97 % | SYSTOLIC BLOOD PRESSURE: 143 MMHG | DIASTOLIC BLOOD PRESSURE: 85 MMHG | HEART RATE: 82 BPM

## 2024-03-26 ENCOUNTER — OFFICE (AMBULATORY)
Dept: URBAN - METROPOLITAN AREA CLINIC 76 | Facility: CLINIC | Age: 64
End: 2024-03-26

## 2024-03-26 DIAGNOSIS — K62.5 HEMORRHAGE OF ANUS AND RECTUM: ICD-10-CM

## 2024-03-26 DIAGNOSIS — K21.9 GASTRO-ESOPHAGEAL REFLUX DISEASE WITHOUT ESOPHAGITIS: ICD-10-CM

## 2024-03-26 DIAGNOSIS — R19.7 DIARRHEA, UNSPECIFIED: ICD-10-CM

## 2024-03-26 DIAGNOSIS — K59.00 CONSTIPATION, UNSPECIFIED: ICD-10-CM

## 2024-03-26 DIAGNOSIS — R15.2 FECAL URGENCY: ICD-10-CM

## 2024-03-26 DIAGNOSIS — K62.89 OTHER SPECIFIED DISEASES OF ANUS AND RECTUM: ICD-10-CM

## 2024-03-26 PROCEDURE — 99214 OFFICE O/P EST MOD 30 MIN: CPT

## 2024-03-26 RX ORDER — COLESTIPOL HYDROCHLORIDE 1 G/1
TABLET, FILM COATED ORAL
Qty: 120 | Refills: 3 | Status: ACTIVE
Start: 2024-03-26

## 2024-04-18 ENCOUNTER — OFFICE VISIT (OUTPATIENT)
Dept: ORTHOPEDIC SURGERY | Facility: CLINIC | Age: 64
End: 2024-04-18
Payer: COMMERCIAL

## 2024-04-18 VITALS
OXYGEN SATURATION: 95 % | BODY MASS INDEX: 31.54 KG/M2 | DIASTOLIC BLOOD PRESSURE: 87 MMHG | HEART RATE: 99 BPM | WEIGHT: 178 LBS | HEIGHT: 63 IN | SYSTOLIC BLOOD PRESSURE: 138 MMHG

## 2024-04-18 DIAGNOSIS — M17.0 BILATERAL PRIMARY OSTEOARTHRITIS OF KNEE: ICD-10-CM

## 2024-04-18 DIAGNOSIS — M25.561 RIGHT KNEE PAIN, UNSPECIFIED CHRONICITY: Primary | ICD-10-CM

## 2024-04-18 RX ORDER — TRIAMCINOLONE ACETONIDE 40 MG/ML
40 INJECTION, SUSPENSION INTRA-ARTICULAR; INTRAMUSCULAR
Status: COMPLETED | OUTPATIENT
Start: 2024-04-18 | End: 2024-04-18

## 2024-04-18 RX ORDER — LIDOCAINE HYDROCHLORIDE 10 MG/ML
5 INJECTION, SOLUTION INFILTRATION; PERINEURAL
Status: COMPLETED | OUTPATIENT
Start: 2024-04-18 | End: 2024-04-18

## 2024-04-18 RX ORDER — PHENTERMINE HYDROCHLORIDE 37.5 MG/1
TABLET ORAL
COMMUNITY
Start: 2024-04-03

## 2024-04-18 RX ADMIN — LIDOCAINE HYDROCHLORIDE 5 ML: 10 INJECTION, SOLUTION INFILTRATION; PERINEURAL at 12:54

## 2024-04-18 RX ADMIN — TRIAMCINOLONE ACETONIDE 40 MG: 40 INJECTION, SUSPENSION INTRA-ARTICULAR; INTRAMUSCULAR at 12:54

## 2024-04-18 RX ADMIN — LIDOCAINE HYDROCHLORIDE 5 ML: 10 INJECTION, SOLUTION INFILTRATION; PERINEURAL at 12:55

## 2024-04-18 RX ADMIN — TRIAMCINOLONE ACETONIDE 40 MG: 40 INJECTION, SUSPENSION INTRA-ARTICULAR; INTRAMUSCULAR at 12:55

## 2024-04-18 NOTE — PROGRESS NOTES
"Chief Complaint  Initial Evaluation of the Right Knee     Subjective      Patricia Whyte presents to [facility] for evaluation of the right knee. She reports she has right knee pain, she believes from over compensating for her left knee osteoarthritis that she gets injections for. She reports pain with stairs. She locates pain to the anterior right knee.       No Known Allergies     Social History     Socioeconomic History    Marital status:    Tobacco Use    Smoking status: Every Day     Current packs/day: 0.50     Types: Cigarettes    Smokeless tobacco: Never   Vaping Use    Vaping status: Never Used   Substance and Sexual Activity    Alcohol use: Not Currently    Drug use: Never    Sexual activity: Defer        I reviewed the patient's chief complaint, history of present illness, review of systems, past medical history, surgical history, family history, social history, medications, and allergy list.     Review of Systems     Constitutional: Denies fevers, chills, weight loss  Cardiovascular: Denies chest pain, shortness of breath  Skin: Denies rashes, acute skin changes  Neurologic: Denies headache, loss of consciousness  MSK: Right knee pain      Vital Signs:   /87   Pulse 99   Ht 160 cm (63\")   Wt 80.7 kg (178 lb)   SpO2 95%   BMI 31.53 kg/m²          Physical Exam  General: Alert. No acute distress    Ortho Exam      Right knee- minimal crepitus. ROM 0-130 degrees. Stable to varus/valgus stress. Stable to anterior/posterior drawer. Negative Lachman's. Negative Brigido's. Positive EHL, FHL, GS and TA. Sensation intact to all 5 nerves of the foot. Positive pulses.     Left knee- Skin is intact, no erythema, no ecchymosis, no swelling, no effusion, no signs of infection, full extension, flexion 120, stable anterior/posterior drawer, stable to varus/valgus stress.  Mild tenderness to palpation of anterior medial and lateral knee along the joint line, no pain with " range of motion. Negative Brigido, Negative Lachman.      Right knee: R knee  Date/Time: 4/18/2024 12:54 PM  Consent given by: patient  Site marked: site marked  Timeout: Immediately prior to procedure a time out was called to verify the correct patient, procedure, equipment, support staff and site/side marked as required   Supporting Documentation  Indications: pain   Procedure Details  Location: knee - R knee  Needle gauge: 21G.  Medications administered: 5 mL lidocaine 1 %; 40 mg triamcinolone acetonide 40 MG/ML  Patient tolerance: patient tolerated the procedure well with no immediate complications      Left knee: L knee  Date/Time: 4/18/2024 12:55 PM  Consent given by: patient  Site marked: site marked  Timeout: Immediately prior to procedure a time out was called to verify the correct patient, procedure, equipment, support staff and site/side marked as required   Supporting Documentation  Indications: pain   Procedure Details  Location: knee - L knee  Needle gauge: 21G.  Medications administered: 5 mL lidocaine 1 %; 40 mg triamcinolone acetonide 40 MG/ML  Patient tolerance: patient tolerated the procedure well with no immediate complications      This injection documentation was Scribed for Jhonathan Morales MD by Barbara Garcia.  04/18/24   12:55 EDT      X-Ray Report:  Right knee X-Ray  Indication: Evaluation of right knee pain  AP/Lateral, Standing, and [place] view(s)  Findings: moderate degenerative changes to the patellofemoral joint. Tricompartmental  osteophytes. No acute fracture or effusion.   Prior studies available for comparison: no       Imaging Results (Most Recent)       Procedure Component Value Units Date/Time    XR Knee 3 View Right [[id number]] Resulted: 04/18/24 1109     Updated: 04/18/24 1115             Result Review :       No results found.           Assessment and Plan     Diagnoses and all orders for this visit:    1. Right knee pain, unspecified chronicity (Primary)  -     XR Knee  3 View Right    2. Bilateral primary osteoarthritis of knee        Discussed the treatment plan with the patient. I reviewed the x-rays that were obtained today with the patient.  Discussed the risks and benefits of bilateral knee steroid injection. The patient expressed understanding and wished to proceed. She tolerated the injections well. Home exercises given today    Educated on risk of smoking/nicotine. Discussed options for smoking cessation. and Call or return if worsening symptoms.    Follow Up     3 months      Patient was given instructions and counseling regarding her condition or for health maintenance advice. Please see specific information pulled into the AVS if appropriate.     Scribed for Jhonathan Morales MD by Radha Almendarez.  04/18/24   11:16 EDT    I have personally performed the services described in this document as scribed by the above individual and it is both accurate and complete. Jhonathan Morales MD 04/18/24

## 2024-07-25 ENCOUNTER — PREP FOR SURGERY (OUTPATIENT)
Dept: OTHER | Facility: HOSPITAL | Age: 64
End: 2024-07-25
Payer: COMMERCIAL

## 2024-07-25 ENCOUNTER — OFFICE VISIT (OUTPATIENT)
Dept: ORTHOPEDIC SURGERY | Facility: CLINIC | Age: 64
End: 2024-07-25
Payer: COMMERCIAL

## 2024-07-25 VITALS
BODY MASS INDEX: 31.18 KG/M2 | DIASTOLIC BLOOD PRESSURE: 79 MMHG | HEART RATE: 90 BPM | OXYGEN SATURATION: 98 % | WEIGHT: 176 LBS | HEIGHT: 63 IN | SYSTOLIC BLOOD PRESSURE: 120 MMHG

## 2024-07-25 DIAGNOSIS — M17.0 BILATERAL PRIMARY OSTEOARTHRITIS OF KNEE: Primary | ICD-10-CM

## 2024-07-25 DIAGNOSIS — M17.12 PRIMARY OSTEOARTHRITIS OF LEFT KNEE: Primary | ICD-10-CM

## 2024-07-25 RX ORDER — TRIAMCINOLONE ACETONIDE 40 MG/ML
40 INJECTION, SUSPENSION INTRA-ARTICULAR; INTRAMUSCULAR
Status: COMPLETED | OUTPATIENT
Start: 2024-07-25 | End: 2024-07-25

## 2024-07-25 RX ORDER — CYCLOBENZAPRINE HCL 10 MG
1 TABLET ORAL 3 TIMES DAILY
COMMUNITY
Start: 2024-07-08

## 2024-07-25 RX ORDER — OMEPRAZOLE 20 MG/1
1 CAPSULE, DELAYED RELEASE ORAL DAILY
COMMUNITY
Start: 2024-07-08

## 2024-07-25 RX ORDER — TRANEXAMIC ACID 10 MG/ML
1000 INJECTION, SOLUTION INTRAVENOUS ONCE
OUTPATIENT
Start: 2024-07-25 | End: 2024-07-25

## 2024-07-25 RX ORDER — IBUPROFEN 800 MG/1
1 TABLET ORAL 3 TIMES DAILY
COMMUNITY
Start: 2024-07-08

## 2024-07-25 RX ORDER — LIDOCAINE HYDROCHLORIDE 10 MG/ML
5 INJECTION, SOLUTION INFILTRATION; PERINEURAL
Status: COMPLETED | OUTPATIENT
Start: 2024-07-25 | End: 2024-07-25

## 2024-07-25 RX ADMIN — LIDOCAINE HYDROCHLORIDE 5 ML: 10 INJECTION, SOLUTION INFILTRATION; PERINEURAL at 11:54

## 2024-07-25 RX ADMIN — TRIAMCINOLONE ACETONIDE 40 MG: 40 INJECTION, SUSPENSION INTRA-ARTICULAR; INTRAMUSCULAR at 11:54

## 2024-07-25 NOTE — PROGRESS NOTES
"Chief Complaint  Follow-up of the Left Knee and Follow-up of the Right Knee     Subjective      Patricia Whyte presents to [facility] for a follow up for bilateral knee pain. She has been treating her bilateral knee osteoarthritis conservatively with injections. She was last seen in the office on 04/18/24 where she had bilateral knee steroid injections. She is requesting repeat injection today for her right knee and is interested in a left knee replacement. She has pain with prolonged walking. She reports no new injury or falls since her last visit.     No Known Allergies     Social History     Socioeconomic History    Marital status:    Tobacco Use    Smoking status: Every Day     Current packs/day: 0.50     Types: Cigarettes    Smokeless tobacco: Never   Vaping Use    Vaping status: Never Used   Substance and Sexual Activity    Alcohol use: Not Currently    Drug use: Never    Sexual activity: Defer        I reviewed the patient's chief complaint, history of present illness, review of systems, past medical history, surgical history, family history, social history, medications, and allergy list.     Review of Systems     Constitutional: Denies fevers, chills, weight loss  Cardiovascular: Denies chest pain, shortness of breath  Skin: Denies rashes, acute skin changes  Neurologic: Denies headache, loss of consciousness  MSK: Bilateral knee pain       Vital Signs:   /79   Pulse 90   Ht 160 cm (63\")   Wt 79.8 kg (176 lb)   SpO2 98%   BMI 31.18 kg/m²            Ortho Exam    Physical Exam  General:Alert. No acute distress   Right knee- minimal crepitus. ROM 0-130 degrees. Stable to varus/valgus stress. Stable to anterior/posterior drawer. Negative Lachman's. Negative Brigido's. Positive EHL, FHL, GS and TA. Sensation intact to all 5 nerves of the foot. Positive pulses.      Left knee- Skin is intact, no erythema, no ecchymosis, no swelling, no effusion, no signs of " infection, full extension, flexion 120, stable anterior/posterior drawer, stable to varus/valgus stress.  Mild tenderness to palpation of anterior medial and lateral knee along the joint line, no pain with range of motion. Negative Brigido, Negative Lachman.      Right knee: R knee  Date/Time: 7/25/2024 11:54 AM  Consent given by: patient  Site marked: site marked  Timeout: Immediately prior to procedure a time out was called to verify the correct patient, procedure, equipment, support staff and site/side marked as required   Supporting Documentation  Indications: pain   Procedure Details  Location: knee - R knee  Needle gauge: 21 G.  Medications administered: 40 mg triamcinolone acetonide 40 MG/ML; 5 mL lidocaine 1 %  Patient tolerance: patient tolerated the procedure well with no immediate complications      This injection documentation was Scribed for Jhonathan Morales MD by Clover Urena MA.  07/25/24   11:54 EDT        Imaging Results (Most Recent)       None             Result Review :       XR Ribs Right With PA Chest    Result Date: 7/3/2024  Narrative: XR RIBS RIGHT W PA CHEST Date of Exam: 7/3/2024 7:50 PM EDT Indication: right rib/chest injury Comparison: Chest x-ray 2/14/2023 Findings: Normal cardiomediastinal silhouette. The lungs are clear. No pleural fluid. No pneumothorax. There is a deformity/cortical irregularity of the posterolateral right fourth rib. The remaining ribs appear unremarkable.     Impression: Impression: Mild displaced fracture of the posterolateral right fourth rib, possibly acute. Correlate for point tenderness at this area. No pneumothorax. Electronically Signed: Bebo Corcoran MD  7/3/2024 8:46 PM EDT  Workstation ID: [id number]            Assessment and Plan     Diagnoses and all orders for this visit:    1. Bilateral primary osteoarthritis of knee (Primary)        The patient presents here today for a follow up for bilateral knee osteoarthritis.     Discussed  operative treatment options regarding a left knee arthroplasty with janet versus non operative treatment regarding injections, medications and physical therapy.     Patient wishes to proceed with operative treatment options for her left knee. Risks and benefits were discussed with the patient today in the office. Patient expressed understanding and wishes to proceed.     Discussed the risks and benefits of a right knee steroid injection today in the office. Patient expressed understanding and wishes to proceed. She tolerated the injection well and without any complications.       Discussed surgery., Discussed with patient the implant type being used during surgery and patient understands., Surgery pamphlet given., Call or return if worsening symptoms., DME order for a 3 in 1 given today due to patient will be confined to one room/level of the home that does not offer a toilet during postop recovery. , and I am ordering the use of the Nice1 cold therapy machine for 60 days post-op as part of an opioid-sparing approach to help manage pain and edema.  I feel this is medically necessary for the best care for this patient.       Follow Up     2 weeks post-operatively      Patient was given instructions and counseling regarding her condition or for health maintenance advice. Please see specific information pulled into the AVS if appropriate.     Scribed for Jhonathan Morales MD by Barbara Garcia.  07/25/24   11:46 EDT    I have personally performed the services described in this document as scribed by the above individual and it is both accurate and complete. Jhonathan Morales MD 07/25/24

## 2024-09-11 ENCOUNTER — TRANSCRIBE ORDERS (OUTPATIENT)
Dept: ADMINISTRATIVE | Facility: HOSPITAL | Age: 64
End: 2024-09-11
Payer: COMMERCIAL

## 2024-09-11 DIAGNOSIS — Z12.31 VISIT FOR SCREENING MAMMOGRAM: Primary | ICD-10-CM

## 2024-09-11 DIAGNOSIS — Z78.0 POST-MENOPAUSAL: Primary | ICD-10-CM

## 2024-09-18 DIAGNOSIS — Z96.652 AFTERCARE FOLLOWING LEFT KNEE JOINT REPLACEMENT SURGERY: Primary | ICD-10-CM

## 2024-09-18 DIAGNOSIS — Z47.1 AFTERCARE FOLLOWING LEFT KNEE JOINT REPLACEMENT SURGERY: Primary | ICD-10-CM

## 2024-10-03 DIAGNOSIS — Z01.818 ENCOUNTER FOR PREADMISSION TESTING: Primary | ICD-10-CM

## 2024-10-08 ENCOUNTER — PRE-ADMISSION TESTING (OUTPATIENT)
Dept: PREADMISSION TESTING | Facility: HOSPITAL | Age: 64
End: 2024-10-08
Payer: COMMERCIAL

## 2024-10-08 VITALS
WEIGHT: 178.57 LBS | BODY MASS INDEX: 31.64 KG/M2 | HEART RATE: 74 BPM | TEMPERATURE: 98.4 F | HEIGHT: 63 IN | OXYGEN SATURATION: 95 % | SYSTOLIC BLOOD PRESSURE: 140 MMHG | DIASTOLIC BLOOD PRESSURE: 68 MMHG | RESPIRATION RATE: 18 BRPM

## 2024-10-08 DIAGNOSIS — M17.12 PRIMARY OSTEOARTHRITIS OF LEFT KNEE: ICD-10-CM

## 2024-10-08 DIAGNOSIS — Z01.818 ENCOUNTER FOR PREADMISSION TESTING: ICD-10-CM

## 2024-10-08 LAB
ALBUMIN SERPL-MCNC: 4.1 G/DL (ref 3.5–5.2)
ALBUMIN/GLOB SERPL: 1.4 G/DL
ALP SERPL-CCNC: 76 U/L (ref 39–117)
ALT SERPL W P-5'-P-CCNC: 14 U/L (ref 1–33)
ANION GAP SERPL CALCULATED.3IONS-SCNC: 9.1 MMOL/L (ref 5–15)
AST SERPL-CCNC: 15 U/L (ref 1–32)
BASOPHILS # BLD AUTO: 0.06 10*3/MM3 (ref 0–0.2)
BASOPHILS NFR BLD AUTO: 0.6 % (ref 0–1.5)
BILIRUB SERPL-MCNC: 0.3 MG/DL (ref 0–1.2)
BUN SERPL-MCNC: 10 MG/DL (ref 8–23)
BUN/CREAT SERPL: 12.2 (ref 7–25)
CALCIUM SPEC-SCNC: 8.9 MG/DL (ref 8.6–10.5)
CHLORIDE SERPL-SCNC: 103 MMOL/L (ref 98–107)
CO2 SERPL-SCNC: 25.9 MMOL/L (ref 22–29)
CREAT SERPL-MCNC: 0.82 MG/DL (ref 0.57–1)
DEPRECATED RDW RBC AUTO: 45.5 FL (ref 37–54)
EGFRCR SERPLBLD CKD-EPI 2021: 81 ML/MIN/1.73
EOSINOPHIL # BLD AUTO: 0.11 10*3/MM3 (ref 0–0.4)
EOSINOPHIL NFR BLD AUTO: 1.2 % (ref 0.3–6.2)
ERYTHROCYTE [DISTWIDTH] IN BLOOD BY AUTOMATED COUNT: 13 % (ref 12.3–15.4)
GLOBULIN UR ELPH-MCNC: 2.9 GM/DL
GLUCOSE SERPL-MCNC: 103 MG/DL (ref 65–99)
HBA1C MFR BLD: 6 % (ref 4.8–5.6)
HCT VFR BLD AUTO: 48.3 % (ref 34–46.6)
HGB BLD-MCNC: 16.3 G/DL (ref 12–15.9)
IMM GRANULOCYTES # BLD AUTO: 0.02 10*3/MM3 (ref 0–0.05)
IMM GRANULOCYTES NFR BLD AUTO: 0.2 % (ref 0–0.5)
LYMPHOCYTES # BLD AUTO: 1.77 10*3/MM3 (ref 0.7–3.1)
LYMPHOCYTES NFR BLD AUTO: 18.7 % (ref 19.6–45.3)
MCH RBC QN AUTO: 32.1 PG (ref 26.6–33)
MCHC RBC AUTO-ENTMCNC: 33.7 G/DL (ref 31.5–35.7)
MCV RBC AUTO: 95.3 FL (ref 79–97)
MONOCYTES # BLD AUTO: 0.67 10*3/MM3 (ref 0.1–0.9)
MONOCYTES NFR BLD AUTO: 7.1 % (ref 5–12)
NEUTROPHILS NFR BLD AUTO: 6.86 10*3/MM3 (ref 1.7–7)
NEUTROPHILS NFR BLD AUTO: 72.2 % (ref 42.7–76)
NRBC BLD AUTO-RTO: 0 /100 WBC (ref 0–0.2)
PLATELET # BLD AUTO: 195 10*3/MM3 (ref 140–450)
PMV BLD AUTO: 10.1 FL (ref 6–12)
POTASSIUM SERPL-SCNC: 4.9 MMOL/L (ref 3.5–5.2)
PROT SERPL-MCNC: 7 G/DL (ref 6–8.5)
RBC # BLD AUTO: 5.07 10*6/MM3 (ref 3.77–5.28)
SODIUM SERPL-SCNC: 138 MMOL/L (ref 136–145)
WBC NRBC COR # BLD AUTO: 9.49 10*3/MM3 (ref 3.4–10.8)

## 2024-10-08 PROCEDURE — 36415 COLL VENOUS BLD VENIPUNCTURE: CPT

## 2024-10-08 PROCEDURE — 83036 HEMOGLOBIN GLYCOSYLATED A1C: CPT

## 2024-10-08 PROCEDURE — 80053 COMPREHEN METABOLIC PANEL: CPT

## 2024-10-08 PROCEDURE — 85025 COMPLETE CBC W/AUTO DIFF WBC: CPT

## 2024-10-08 PROCEDURE — 93005 ELECTROCARDIOGRAM TRACING: CPT

## 2024-10-08 RX ORDER — ERGOCALCIFEROL 1.25 MG/1
1 CAPSULE, LIQUID FILLED ORAL WEEKLY
COMMUNITY
Start: 2024-08-31

## 2024-10-08 RX ORDER — ATORVASTATIN CALCIUM 10 MG/1
1 TABLET, FILM COATED ORAL NIGHTLY
COMMUNITY
Start: 2024-09-10

## 2024-10-08 NOTE — DISCHARGE INSTRUCTIONS
IMPORTANT INSTRUCTIONS - PRE-ADMISSION TESTING  DO NOT EAT OR CHEW anything after midnight the night before your procedure.    You may have CLEAR liquids up to 3 hours prior to ARRIVAL time.   Take the following medications the morning of your procedure with JUST A SIP OF WATER: INHALER,  OMEPRAZOLE PRN    DO NOT BRING your medications to the hospital with you, UNLESS something has changed since your PRE-Admission Testing appointment.  Hold all vitamins, supplements, and NSAIDS (Non- steroidal anti-inflammatory meds) for one week prior to surgery (you MAY take Tylenol or Acetaminophen).  If you are diabetic, check your blood sugar the morning of your procedure. If it is less than 70 or if you are feeling symptomatic, call the following number for further instructions: [phone]  Use your inhalers/nebulizers as usual, the morning of your procedure. BRING YOUR INHALERS with you.   Bring your CPAP or BIPAP to hospital, ONLY IF YOU WILL BE SPENDING THE NIGHT.   Make sure you have a ride home and have someone who will stay with you the day of your procedure after you go home.  If you have any questions, please call your Pre-Admission Testing NurseLUISA at [phone] .   Per anesthesia request, do not smoke for 24 hours before your procedure or as instructed by your surgeon.      Clear Liquid Diet        Find out when you need to start a clear liquid diet.   Think of “clear liquids” as anything you could read a newspaper through. This includes things like water, broth, sports drinks, or tea WITHOUT any kind of milk or cream.           Once you are told to start a clear liquid diet, only drink these things until 3 hours before arrival to the hospital or when the hospital says to stop. Total volume limitation: 8 oz.       Clear liquids you CAN drink:   Water   Clear broth: beef, chicken, vegetable, or bone broth with nothing in it   Gatorade   Lemonade or Wallace-aid   Soda   Tea, coffee (NO cream or honey)    Jell-O (without fruit)   Popsicles (without fruit or cream)   Italian ices   Juice without pulp: apple, white, grape   You may use salt, pepper, and sugar    Do NOT drink:   Milk or cream   Soy milk, almond milk, coconut milk, or other non-dairy drinks and   creamers   Milkshakes or smoothies   Tomato juice   Orange juice   Grapefruit juice   Cream soups or any other than broth         Clear Liquid Diet:  Do NOT eat any solid food.  Do NOT eat or suck on mints or candy.  Do NOT chew gum.  Do NOT drink thick liquids like milk or juice with pulp in it.  Do NOT add milk, cream, or anything like soy milk or almond milk to coffee or tea.   PREOPERATIVE (BEFORE SURGERY)              BATHING INSTRUCTIONS  Instructions:    You will need to shower 3 separate times utilizing the soap provided; at the times indicated   below:     - 10/14 PM   - 10/15 AM    - 10/15 PM      Wash your hair and face with normal shampoo and soap, rinse it well before using the surgical soap.      In the shower, wet the skin completely with water from your neck to your feet. Apply the cleanser to your   body ONLY FROM THE NECK TO YOUR FEET.     Do NOT USE THE CLEANSER ON YOUR FACE, HEAD, OR GENITAL (PRIVATE) AREAS.   Keep it out of your eyes, ears, and mouth because of the risk of injury to those areas.      Scrub with a clean washcloth for each bath utilizing the soap provided from the top of your body to the   bottom starting at the neck area.      Pay close attention to your armpits, groin area, and the site of surgery.      Wash your body gently for 5 minutes. Stand outside the stream or turn off the water while scrubbing your   body. Do NOT wash with your regular soap after the surgical cleanser is used.      RINSE THE CLEANSER OFF COMPLETELY with plenty of water. Rinse the area again thoroughly.      Dry off with a clean towel. The surgical soap can cause dryness; however do NOT APPLY LOTION,   CREAM, POWDER, and/or DEODORANT AFTER  SHOWERING.     Be sure to where clean clothes after showering.      Ensure CLEAN BED LINENS AFTER FIRST wash with the surgical soap.      NO PETS ALLOWED IN THE BED with you after utilizing the surgical soap.

## 2024-10-10 ENCOUNTER — HOSPITAL ENCOUNTER (OUTPATIENT)
Dept: BONE DENSITY | Facility: HOSPITAL | Age: 64
Discharge: HOME | End: 2024-10-10
Payer: COMMERCIAL

## 2024-10-10 ENCOUNTER — HOSPITAL ENCOUNTER (OUTPATIENT)
Dept: MAMMOGRAPHY | Facility: HOSPITAL | Age: 64
Discharge: HOME | End: 2024-10-10
Payer: COMMERCIAL

## 2024-10-10 DIAGNOSIS — Z12.31 VISIT FOR SCREENING MAMMOGRAM: ICD-10-CM

## 2024-10-10 DIAGNOSIS — Z78.0 POST-MENOPAUSAL: ICD-10-CM

## 2024-10-10 PROCEDURE — 77063 BREAST TOMOSYNTHESIS BI: CPT

## 2024-10-10 PROCEDURE — 77067 SCR MAMMO BI INCL CAD: CPT

## 2024-10-10 PROCEDURE — 77080 DXA BONE DENSITY AXIAL: CPT

## 2024-10-13 LAB
QT INTERVAL: 393 MS
QTC INTERVAL: 438 MS

## 2024-10-16 ENCOUNTER — ANESTHESIA EVENT CONVERTED (OUTPATIENT)
Dept: ANESTHESIOLOGY | Facility: HOSPITAL | Age: 64
End: 2024-10-16
Payer: COMMERCIAL

## 2024-10-16 ENCOUNTER — ANESTHESIA EVENT (OUTPATIENT)
Dept: PERIOP | Facility: HOSPITAL | Age: 64
End: 2024-10-16
Payer: COMMERCIAL

## 2024-10-16 ENCOUNTER — APPOINTMENT (OUTPATIENT)
Dept: GENERAL RADIOLOGY | Facility: HOSPITAL | Age: 64
End: 2024-10-16
Payer: COMMERCIAL

## 2024-10-16 ENCOUNTER — HOSPITAL ENCOUNTER (OUTPATIENT)
Facility: HOSPITAL | Age: 64
Discharge: HOME | End: 2024-10-18
Attending: ORTHOPAEDIC SURGERY | Admitting: ORTHOPAEDIC SURGERY
Payer: COMMERCIAL

## 2024-10-16 ENCOUNTER — ANESTHESIA (OUTPATIENT)
Dept: PERIOP | Facility: HOSPITAL | Age: 64
End: 2024-10-16
Payer: COMMERCIAL

## 2024-10-16 DIAGNOSIS — M17.12 PRIMARY OSTEOARTHRITIS OF LEFT KNEE: ICD-10-CM

## 2024-10-16 DIAGNOSIS — R26.2 DIFFICULTY IN WALKING: Primary | ICD-10-CM

## 2024-10-16 DIAGNOSIS — Z78.9 DECREASED ACTIVITIES OF DAILY LIVING (ADL): ICD-10-CM

## 2024-10-16 PROCEDURE — 25010000002 MIDAZOLAM PER 1MG: Performed by: ANESTHESIOLOGY

## 2024-10-16 PROCEDURE — 73560 X-RAY EXAM OF KNEE 1 OR 2: CPT

## 2024-10-16 PROCEDURE — 25810000003 LACTATED RINGERS PER 1000 ML: Performed by: ANESTHESIOLOGY

## 2024-10-16 PROCEDURE — 25010000002 CEFAZOLIN PER 500 MG: Performed by: ORTHOPAEDIC SURGERY

## 2024-10-16 PROCEDURE — 27447 TOTAL KNEE ARTHROPLASTY: CPT | Performed by: PHYSICIAN ASSISTANT

## 2024-10-16 PROCEDURE — 25010000002 MORPHINE PER 10 MG: Performed by: ORTHOPAEDIC SURGERY

## 2024-10-16 PROCEDURE — 25010000002 HYDROMORPHONE 1 MG/ML SOLUTION

## 2024-10-16 PROCEDURE — 25010000002 FENTANYL CITRATE (PF) 50 MCG/ML SOLUTION

## 2024-10-16 PROCEDURE — C1713 ANCHOR/SCREW BN/BN,TIS/BN: HCPCS | Performed by: ORTHOPAEDIC SURGERY

## 2024-10-16 PROCEDURE — 99214 OFFICE O/P EST MOD 30 MIN: CPT | Performed by: PHYSICIAN ASSISTANT

## 2024-10-16 PROCEDURE — 25010000002 SUGAMMADEX 200 MG/2ML SOLUTION

## 2024-10-16 PROCEDURE — 25010000002 LIDOCAINE PF 2% 2 % SOLUTION

## 2024-10-16 PROCEDURE — 25810000003 SODIUM CHLORIDE 0.9 % SOLUTION: Performed by: ORTHOPAEDIC SURGERY

## 2024-10-16 PROCEDURE — 94799 UNLISTED PULMONARY SVC/PX: CPT

## 2024-10-16 PROCEDURE — C1776 JOINT DEVICE (IMPLANTABLE): HCPCS | Performed by: ORTHOPAEDIC SURGERY

## 2024-10-16 PROCEDURE — 25010000002 DEXAMETHASONE PER 1 MG

## 2024-10-16 PROCEDURE — 25010000002 EPINEPHRINE 1 MG/ML SOLUTION: Performed by: ORTHOPAEDIC SURGERY

## 2024-10-16 PROCEDURE — 25010000002 ROPIVACAINE PER 1 MG: Performed by: ORTHOPAEDIC SURGERY

## 2024-10-16 PROCEDURE — 25810000003 LACTATED RINGERS PER 1000 ML

## 2024-10-16 PROCEDURE — 20985 CPTR-ASST DIR MS PX: CPT | Performed by: ORTHOPAEDIC SURGERY

## 2024-10-16 PROCEDURE — 25010000002 PROPOFOL 10 MG/ML EMULSION

## 2024-10-16 PROCEDURE — 25010000002 ONDANSETRON PER 1 MG

## 2024-10-16 PROCEDURE — 25010000002 KETOROLAC TROMETHAMINE PER 15 MG: Performed by: ORTHOPAEDIC SURGERY

## 2024-10-16 PROCEDURE — 27447 TOTAL KNEE ARTHROPLASTY: CPT | Performed by: ORTHOPAEDIC SURGERY

## 2024-10-16 DEVICE — STEM TIB/KN PERSONA CMT 5D SZC LT: Type: IMPLANTABLE DEVICE | Site: KNEE | Status: FUNCTIONAL

## 2024-10-16 DEVICE — CAP TOTL KN CMT PRIMARY W/ROSA: Type: IMPLANTABLE DEVICE | Site: KNEE | Status: FUNCTIONAL

## 2024-10-16 DEVICE — ART/SRF KN PERSONA/VE PS CD MC 6TO7 11MM LT: Type: IMPLANTABLE DEVICE | Site: KNEE | Status: FUNCTIONAL

## 2024-10-16 DEVICE — COMP FEM/KN PERSONA CR CMT NRW SZ7 LT: Type: IMPLANTABLE DEVICE | Site: KNEE | Status: FUNCTIONAL

## 2024-10-16 DEVICE — CMT BONE PALACOS R HI/VISC 1X40: Type: IMPLANTABLE DEVICE | Site: KNEE | Status: FUNCTIONAL

## 2024-10-16 DEVICE — PAT KN PERSONA VE CRS/LNK CMT 8X29MM: Type: IMPLANTABLE DEVICE | Site: KNEE | Status: FUNCTIONAL

## 2024-10-16 RX ORDER — SODIUM CHLORIDE, SODIUM LACTATE, POTASSIUM CHLORIDE, CALCIUM CHLORIDE 600; 310; 30; 20 MG/100ML; MG/100ML; MG/100ML; MG/100ML
INJECTION, SOLUTION INTRAVENOUS CONTINUOUS PRN
Status: DISCONTINUED | OUTPATIENT
Start: 2024-10-16 | End: 2024-10-16 | Stop reason: SURG

## 2024-10-16 RX ORDER — ACETAMINOPHEN 500 MG
1000 TABLET ORAL EVERY 8 HOURS
Status: DISCONTINUED | OUTPATIENT
Start: 2024-10-16 | End: 2024-10-18 | Stop reason: HOSPADM

## 2024-10-16 RX ORDER — ONDANSETRON 2 MG/ML
4 INJECTION INTRAMUSCULAR; INTRAVENOUS EVERY 6 HOURS PRN
Status: DISCONTINUED | OUTPATIENT
Start: 2024-10-16 | End: 2024-10-18 | Stop reason: HOSPADM

## 2024-10-16 RX ORDER — DEXAMETHASONE SODIUM PHOSPHATE 4 MG/ML
INJECTION, SOLUTION INTRA-ARTICULAR; INTRALESIONAL; INTRAMUSCULAR; INTRAVENOUS; SOFT TISSUE AS NEEDED
Status: DISCONTINUED | OUTPATIENT
Start: 2024-10-16 | End: 2024-10-16 | Stop reason: SURG

## 2024-10-16 RX ORDER — FENTANYL CITRATE 50 UG/ML
INJECTION, SOLUTION INTRAMUSCULAR; INTRAVENOUS AS NEEDED
Status: DISCONTINUED | OUTPATIENT
Start: 2024-10-16 | End: 2024-10-16 | Stop reason: SURG

## 2024-10-16 RX ORDER — LIDOCAINE HYDROCHLORIDE 20 MG/ML
INJECTION, SOLUTION EPIDURAL; INFILTRATION; INTRACAUDAL; PERINEURAL AS NEEDED
Status: DISCONTINUED | OUTPATIENT
Start: 2024-10-16 | End: 2024-10-16 | Stop reason: SURG

## 2024-10-16 RX ORDER — MIDAZOLAM HYDROCHLORIDE 2 MG/2ML
2 INJECTION, SOLUTION INTRAMUSCULAR; INTRAVENOUS ONCE
Status: COMPLETED | OUTPATIENT
Start: 2024-10-16 | End: 2024-10-16

## 2024-10-16 RX ORDER — OXYCODONE HYDROCHLORIDE 5 MG/1
5 TABLET ORAL
Status: DISCONTINUED | OUTPATIENT
Start: 2024-10-16 | End: 2024-10-16 | Stop reason: HOSPADM

## 2024-10-16 RX ORDER — PROPOFOL 10 MG/ML
VIAL (ML) INTRAVENOUS AS NEEDED
Status: DISCONTINUED | OUTPATIENT
Start: 2024-10-16 | End: 2024-10-16 | Stop reason: SURG

## 2024-10-16 RX ORDER — DICYCLOMINE HCL 20 MG
20 TABLET ORAL 3 TIMES DAILY PRN
Status: DISCONTINUED | OUTPATIENT
Start: 2024-10-16 | End: 2024-10-18 | Stop reason: HOSPADM

## 2024-10-16 RX ORDER — NALOXONE HCL 0.4 MG/ML
0.4 VIAL (ML) INJECTION
Status: DISCONTINUED | OUTPATIENT
Start: 2024-10-16 | End: 2024-10-18 | Stop reason: HOSPADM

## 2024-10-16 RX ORDER — ONDANSETRON 2 MG/ML
INJECTION INTRAMUSCULAR; INTRAVENOUS AS NEEDED
Status: DISCONTINUED | OUTPATIENT
Start: 2024-10-16 | End: 2024-10-16 | Stop reason: SURG

## 2024-10-16 RX ORDER — PROMETHAZINE HYDROCHLORIDE 25 MG/1
25 SUPPOSITORY RECTAL ONCE AS NEEDED
Status: DISCONTINUED | OUTPATIENT
Start: 2024-10-16 | End: 2024-10-16 | Stop reason: HOSPADM

## 2024-10-16 RX ORDER — SODIUM CHLORIDE, SODIUM LACTATE, POTASSIUM CHLORIDE, CALCIUM CHLORIDE 600; 310; 30; 20 MG/100ML; MG/100ML; MG/100ML; MG/100ML
9 INJECTION, SOLUTION INTRAVENOUS ONCE
Status: COMPLETED | OUTPATIENT
Start: 2024-10-16 | End: 2024-10-16

## 2024-10-16 RX ORDER — PANTOPRAZOLE SODIUM 40 MG/1
40 TABLET, DELAYED RELEASE ORAL
Status: DISCONTINUED | OUTPATIENT
Start: 2024-10-17 | End: 2024-10-18 | Stop reason: HOSPADM

## 2024-10-16 RX ORDER — BUPIVACAINE HYDROCHLORIDE AND EPINEPHRINE 5; 5 MG/ML; UG/ML
INJECTION, SOLUTION EPIDURAL; INTRACAUDAL; PERINEURAL
Status: COMPLETED | OUTPATIENT
Start: 2024-10-16 | End: 2024-10-16

## 2024-10-16 RX ORDER — HYDROCODONE BITARTRATE AND ACETAMINOPHEN 7.5; 325 MG/1; MG/1
2 TABLET ORAL EVERY 4 HOURS PRN
Status: DISCONTINUED | OUTPATIENT
Start: 2024-10-16 | End: 2024-10-18 | Stop reason: HOSPADM

## 2024-10-16 RX ORDER — SCOLOPAMINE TRANSDERMAL SYSTEM 1 MG/1
1 PATCH, EXTENDED RELEASE TRANSDERMAL ONCE
Status: DISCONTINUED | OUTPATIENT
Start: 2024-10-16 | End: 2024-10-16

## 2024-10-16 RX ORDER — ONDANSETRON 2 MG/ML
4 INJECTION INTRAMUSCULAR; INTRAVENOUS ONCE AS NEEDED
Status: DISCONTINUED | OUTPATIENT
Start: 2024-10-16 | End: 2024-10-16 | Stop reason: HOSPADM

## 2024-10-16 RX ORDER — TRANEXAMIC ACID 10 MG/ML
1000 INJECTION, SOLUTION INTRAVENOUS ONCE
Status: COMPLETED | OUTPATIENT
Start: 2024-10-16 | End: 2024-10-16

## 2024-10-16 RX ORDER — ONDANSETRON 4 MG/1
4 TABLET, ORALLY DISINTEGRATING ORAL EVERY 6 HOURS PRN
Status: DISCONTINUED | OUTPATIENT
Start: 2024-10-16 | End: 2024-10-18 | Stop reason: HOSPADM

## 2024-10-16 RX ORDER — MAGNESIUM HYDROXIDE 1200 MG/15ML
LIQUID ORAL AS NEEDED
Status: DISCONTINUED | OUTPATIENT
Start: 2024-10-16 | End: 2024-10-16 | Stop reason: HOSPADM

## 2024-10-16 RX ORDER — PROMETHAZINE HYDROCHLORIDE 12.5 MG/1
25 TABLET ORAL ONCE AS NEEDED
Status: DISCONTINUED | OUTPATIENT
Start: 2024-10-16 | End: 2024-10-16 | Stop reason: HOSPADM

## 2024-10-16 RX ORDER — SODIUM CHLORIDE 9 MG/ML
100 INJECTION, SOLUTION INTRAVENOUS CONTINUOUS
Status: ACTIVE | OUTPATIENT
Start: 2024-10-16 | End: 2024-10-17

## 2024-10-16 RX ORDER — HYDROCODONE BITARTRATE AND ACETAMINOPHEN 7.5; 325 MG/1; MG/1
1 TABLET ORAL EVERY 4 HOURS PRN
Status: DISCONTINUED | OUTPATIENT
Start: 2024-10-16 | End: 2024-10-18 | Stop reason: HOSPADM

## 2024-10-16 RX ORDER — NICOTINE 21 MG/24HR
1 PATCH, TRANSDERMAL 24 HOURS TRANSDERMAL
Status: DISCONTINUED | OUTPATIENT
Start: 2024-10-16 | End: 2024-10-18 | Stop reason: HOSPADM

## 2024-10-16 RX ORDER — KETOROLAC TROMETHAMINE 15 MG/ML
15 INJECTION, SOLUTION INTRAMUSCULAR; INTRAVENOUS EVERY 6 HOURS
Status: COMPLETED | OUTPATIENT
Start: 2024-10-16 | End: 2024-10-17

## 2024-10-16 RX ORDER — ACETAMINOPHEN 500 MG
1000 TABLET ORAL ONCE
Status: COMPLETED | OUTPATIENT
Start: 2024-10-16 | End: 2024-10-16

## 2024-10-16 RX ORDER — IPRATROPIUM BROMIDE AND ALBUTEROL SULFATE 2.5; .5 MG/3ML; MG/3ML
3 SOLUTION RESPIRATORY (INHALATION) EVERY 4 HOURS PRN
Status: DISCONTINUED | OUTPATIENT
Start: 2024-10-16 | End: 2024-10-18 | Stop reason: HOSPADM

## 2024-10-16 RX ORDER — ATORVASTATIN CALCIUM 10 MG/1
10 TABLET, FILM COATED ORAL NIGHTLY
Status: DISCONTINUED | OUTPATIENT
Start: 2024-10-16 | End: 2024-10-18 | Stop reason: HOSPADM

## 2024-10-16 RX ORDER — ROCURONIUM BROMIDE 10 MG/ML
INJECTION, SOLUTION INTRAVENOUS AS NEEDED
Status: DISCONTINUED | OUTPATIENT
Start: 2024-10-16 | End: 2024-10-16 | Stop reason: SURG

## 2024-10-16 RX ADMIN — ROCURONIUM BROMIDE 10 MG: 10 INJECTION, SOLUTION INTRAVENOUS at 14:59

## 2024-10-16 RX ADMIN — SODIUM CHLORIDE 100 ML/HR: 9 INJECTION, SOLUTION INTRAVENOUS at 18:30

## 2024-10-16 RX ADMIN — SCOPALAMINE 1 PATCH: 1 PATCH, EXTENDED RELEASE TRANSDERMAL at 11:09

## 2024-10-16 RX ADMIN — SODIUM CHLORIDE, POTASSIUM CHLORIDE, SODIUM LACTATE AND CALCIUM CHLORIDE: 600; 310; 30; 20 INJECTION, SOLUTION INTRAVENOUS at 15:27

## 2024-10-16 RX ADMIN — KETOROLAC TROMETHAMINE 15 MG: 15 INJECTION, SOLUTION INTRAMUSCULAR; INTRAVENOUS at 23:53

## 2024-10-16 RX ADMIN — TRANEXAMIC ACID 1000 MG: 10 INJECTION, SOLUTION INTRAVENOUS at 13:11

## 2024-10-16 RX ADMIN — ROCURONIUM BROMIDE 50 MG: 10 INJECTION, SOLUTION INTRAVENOUS at 14:08

## 2024-10-16 RX ADMIN — ONDANSETRON HYDROCHLORIDE 4 MG: 2 SOLUTION INTRAMUSCULAR; INTRAVENOUS at 14:13

## 2024-10-16 RX ADMIN — DEXAMETHASONE SODIUM PHOSPHATE 4 MG: 4 INJECTION, SOLUTION INTRAMUSCULAR; INTRAVENOUS at 14:13

## 2024-10-16 RX ADMIN — ATORVASTATIN CALCIUM 10 MG: 10 TABLET, FILM COATED ORAL at 21:45

## 2024-10-16 RX ADMIN — BUPIVACAINE HYDROCHLORIDE AND EPINEPHRINE BITARTRATE 30 ML: 5; .005 INJECTION, SOLUTION EPIDURAL; INTRACAUDAL; PERINEURAL at 13:33

## 2024-10-16 RX ADMIN — SODIUM CHLORIDE, POTASSIUM CHLORIDE, SODIUM LACTATE AND CALCIUM CHLORIDE 9 ML/HR: 600; 310; 30; 20 INJECTION, SOLUTION INTRAVENOUS at 10:43

## 2024-10-16 RX ADMIN — FENTANYL CITRATE 100 MCG: 50 INJECTION, SOLUTION INTRAMUSCULAR; INTRAVENOUS at 14:06

## 2024-10-16 RX ADMIN — CEFAZOLIN 2 G: 2 INJECTION, POWDER, FOR SOLUTION INTRAVENOUS at 21:45

## 2024-10-16 RX ADMIN — MIDAZOLAM HYDROCHLORIDE 2 MG: 1 INJECTION, SOLUTION INTRAMUSCULAR; INTRAVENOUS at 13:26

## 2024-10-16 RX ADMIN — SODIUM CHLORIDE 2 G: 9 INJECTION, SOLUTION INTRAVENOUS at 14:13

## 2024-10-16 RX ADMIN — MIDAZOLAM HYDROCHLORIDE 2 MG: 1 INJECTION, SOLUTION INTRAMUSCULAR; INTRAVENOUS at 13:09

## 2024-10-16 RX ADMIN — ACETAMINOPHEN 1000 MG: 500 TABLET ORAL at 21:45

## 2024-10-16 RX ADMIN — SUGAMMADEX 200 MG: 100 INJECTION, SOLUTION INTRAVENOUS at 15:45

## 2024-10-16 RX ADMIN — PROPOFOL 150 MG: 10 INJECTION, EMULSION INTRAVENOUS at 14:08

## 2024-10-16 RX ADMIN — SODIUM CHLORIDE, POTASSIUM CHLORIDE, SODIUM LACTATE AND CALCIUM CHLORIDE: 600; 310; 30; 20 INJECTION, SOLUTION INTRAVENOUS at 14:03

## 2024-10-16 RX ADMIN — ACETAMINOPHEN 1000 MG: 500 TABLET ORAL at 12:20

## 2024-10-16 RX ADMIN — HYDROMORPHONE HYDROCHLORIDE 0.5 MG: 1 INJECTION, SOLUTION INTRAMUSCULAR; INTRAVENOUS; SUBCUTANEOUS at 15:49

## 2024-10-16 RX ADMIN — TRANEXAMIC ACID 1000 MG: 10 INJECTION, SOLUTION INTRAVENOUS at 15:29

## 2024-10-16 RX ADMIN — KETOROLAC TROMETHAMINE 15 MG: 15 INJECTION, SOLUTION INTRAMUSCULAR; INTRAVENOUS at 18:30

## 2024-10-16 RX ADMIN — OXYCODONE 5 MG: 5 TABLET ORAL at 16:51

## 2024-10-16 RX ADMIN — LIDOCAINE HYDROCHLORIDE 100 MG: 20 INJECTION, SOLUTION INTRAVENOUS at 14:08

## 2024-10-16 RX ADMIN — NICOTINE 1 PATCH: 14 PATCH, EXTENDED RELEASE TRANSDERMAL at 18:30

## 2024-10-16 NOTE — ANESTHESIA POSTPROCEDURE EVALUATION
Patient: Patricia Whyte    Procedure Summary       Date: 10/16/24 Room / Location: [facility] OR 03 / [facility] MAIN OR    Anesthesia Start: 1403 Anesthesia Stop: 1556    Procedure: LEFT TOTAL KNEE ARTHROPLASTY WITH DIALLO ROBOT (Left: Knee) Diagnosis:       Primary osteoarthritis of left knee      (Primary osteoarthritis of left knee [M17.12])    Surgeons: Jhonathan Morales MD Provider: Juan Pelayo MD    Anesthesia Type: general, regional ASA Status: 2            Anesthesia Type: general, regional    Vitals  Vitals Value Taken Time   /64 10/16/24 1623   Temp 36.1 °C (96.9 °F) 10/16/24 1600   Pulse 92 10/16/24 1624   Resp 14 10/16/24 1620   SpO2 95 % 10/16/24 1624   Vitals shown include unfiled device data.        Post Anesthesia Care and Evaluation    Patient location during evaluation: bedside  Patient participation: complete - patient participated  Level of consciousness: awake  Pain management: adequate    Airway patency: patent  PONV Status: none  Cardiovascular status: acceptable and stable  Respiratory status: acceptable  Hydration status: acceptable

## 2024-10-16 NOTE — H&P
[facility]   HISTORY AND PHYSICAL    Patient Name: Patricia Whyte  : 1960  MRN: [id number]  Primary Care Physician:  Puja Santiago APRN  Date of admission: (Not on file)    Subjective   Subjective     Chief Complaint: Left knee pain    History of present illness: The patient is a 64-year-old female with left knee pain.  The patient has had chronic left knee pain that has had extensive conservative treatment.  The patient has tried nonoperative management without relief and continues to have symptoms to the left knee.  She reports pain and stiffness in the knee.  There is reduced ability to do normal activities.  She wishes to undergo knee replacement surgery.    Review of Systems: Negative except for those mentioned in the history of present illness    Personal History     Past Medical History:   Diagnosis Date    Cancer     cervical cancer-treated with chemo and radiation    GERD (gastroesophageal reflux disease)     History of fusion of cervical spine     Hyperlipidemia     IBS (irritable bowel syndrome)     Osteoarthritis     Seasonal allergies     Sleep apnea        Past Surgical History:   Procedure Laterality Date    ANTERIOR CERVICAL FUSION       SECTION      CHOLECYSTECTOMY      COLON RESECTION      HYSTERECTOMY      POSTERIOR CERVICAL FUSION         Family History: family history is not on file. Otherwise pertinent FHx was reviewed and not pertinent to current issue.    Social History:  reports that she has been smoking cigarettes. She has never used smokeless tobacco. She reports that she does not currently use alcohol. She reports that she does not use drugs.    Home Medications:  AeroChamber MV, NON FORMULARY, albuterol sulfate HFA, atorvastatin, dicyclomine, ibuprofen, omeprazole, and vitamin D    Allergies:  No Known Allergies    Objective    Objective     Vitals:        Physical Exam  General: No apparent distress, alert and oriented x 3  HEENT:  Normocephalic/atraumatic  Neck: Supple  Cardiovascular: Regular heart rate  Chest: Unlabored breathing  Abdomen: Soft, nontender nondistended  Musculoskeletal: Neurovascular intact to extremity.  Positive pulses.  Stability intact.  Reduced knee range of motion.  Ambulates with antalgic gait.  Mild swelling.  Tender to palpation to the knee.  Neurological: Grossly intact    Result Review    Result Review:  I have personally reviewed the results from the time of this admission to 10/15/2024 21:06 EDT and agree with these findings:  []  Laboratory list / accordion  []  Microbiology  [x]  Radiology  []  EKG/Telemetry   []  Cardiology/Vascular   []  Pathology  []  Old records  []  Other:  Most notable findings include: X-rays: Left knee osteoarthritis      Assessment & Plan   Assessment / Plan     Brief Patient Summary:  Patricia Whyte is a 64 y.o. female who has left knee osteoarthritis    Active Hospital Problems:  Active Hospital Problems    Diagnosis     **Primary osteoarthritis of left knee      Plan: I discussed treatment options with the patient.  Operative versus nonoperative treatment was discussed.  Risks and benefits of surgery were discussed.  Informed consent was obtained and the patient wished to proceed with left total knee arthroplasty.      VTE Prophylaxis:  No VTE prophylaxis order currently exists.        CODE STATUS:       Admission Status:  I believe this patient meets outpatient status.    Jhonathan Morales MD

## 2024-10-16 NOTE — ANESTHESIA PREPROCEDURE EVALUATION
Anesthesia Evaluation     Patient summary reviewed and Nursing notes reviewed   no history of anesthetic complications:   NPO Solid Status: > 8 hours  NPO Liquid Status: > 2 hours           Airway   Mallampati: I  TM distance: >3 FB  Neck ROM: full  Dental - normal exam     Pulmonary - normal exam   (+) ,sleep apnea  Cardiovascular - normal exam  Exercise tolerance: good (4-7 METS)    (+) hyperlipidemia      Neuro/Psych- negative ROS  GI/Hepatic/Renal/Endo    (+) obesity, GERD    ROS Comment: IBS    Musculoskeletal     (+) neck pain  Abdominal   (+) obese   Substance History - negative use     OB/GYN negative ob/gyn ROS         Other   arthritis,     ROS/Med Hx Other: >4METS.HX HLD,IBS,GERD,CERVICAL FUSION. DENIES C/P, SOA. TJR. KT               Anesthesia Plan    ASA 2     general and regional     intravenous induction     Anesthetic plan, risks, benefits, and alternatives have been provided, discussed and informed consent has been obtained with: patient.    Plan discussed with CRNA.    CODE STATUS:

## 2024-10-16 NOTE — OP NOTE
TOTAL KNEE ARTHROPLASTY WITH DIALLO ROBOT  Procedure Report    Patient Name:  Patricia Whyte  YOB: 1960    Date of Surgery:  10/16/2024     Indications:  The patient has had persistent knee pain and x-rays reveal advanced osteoarthritis.  They wish to proceed with operative treatment.  Risks and benefits of surgery were discussed.  Risk of bleeding, infection, damage to neurovascular structures, heart attack, stroke, DVT/PE, anesthesia complications including death, stiffness, instability, periprosthetic fracture, deep infection, among others were discussed.  Informed consent was obtained and they wish to proceed.      Pre-op Diagnosis:   Primary osteoarthritis of left knee [M17.12]       Post-Op Diagnosis Codes:     * Primary osteoarthritis of left knee [M17.12]    Procedure/CPT® Codes:      Procedure(s):  LEFT TOTAL KNEE ARTHROPLASTY WITH DIALLO ROBOT    Staff:  Surgeon(s):  Jhonathan Morales MD    Assistant: Josephine Hines RN; Vinnie Brandt PA    Surgical Approach: Knee Medial Parapatellar        Anesthesia: General with Block    Estimated Blood Loss: 75 mL    Implants:    Implant Name Type Inv. Item Serial No.  Lot No. LRB No. Used Action   CMT BONE PALACOS R HI/VISC 1X40 - [id number] Implant CMT BONE PALACOS R HI/VISC 1X40  [facility] [id number] Left 2 Implanted   PAT KN PERSONA VE CRS/LNK CMT 8X29MM - [id number] Implant PAT KN PERSONA VE CRS/LNK CMT 8X29MM  [employer] [id number] Left 1 Implanted   STEM TIB/KN PERSONA CMT 5D SZC LT - [id number] Implant STEM TIB/KN PERSONA CMT 5D SZC LT  [employer] [id number] Left 1 Implanted   COMP FEM/KN PERSONA CR CMT NRW SZ7 LT - [id number] Implant COMP FEM/KN PERSONA CR CMT NRW SZ7 LT  [employer] [id number] Left 1 Implanted   ART/SRF KN PERSONA/VE PS CD MC 6TO7 11MM LT - [id number] Implant ART/SRF KN PERSONA/VE PS CD MC 6TO7 11MM LT  [employer] [id number] Left 1 Implanted       Specimen:          None        Findings: knee  osteoarthritis    Complications: None    Description of Procedure: The patient was brought to the operating room and placed supine on the OR table.  General anesthesia was given.  Preoperatively, the patient received an adductor canal nerve block. The patient was placed supine on the OR table.  All bony prominences were padded. The tourniquet was placed on the thigh. The affected lower extremity was prepped and draped in the usual sterile fashion. Preoperative antibiotic was given. Tranexamic acid intravenously was given at the beginning and the end of the surgery.  At this point, an Esmarch was used to exsanguinate the limb and the tourniquet was inflated. A longitudinal incision was made over the knee and a medial parapatellar arthrotomy was performed.  Appropriate releases were performed to the proximal medial tibia. The pre-patellar fat pad was excised.  The patella was subluxed laterally and the knee was examined. There was tricompartmental wear and osteophyte formation.  The medial and lateral menisci were removed.  Osteophytes of  the femur were debrided.     At this point 2 threaded guidepins were placed at the distal femur and the tracking sensor for the femur for the Teresita robot were placed.  2 percutaneous stab incisions were made in the proximal tibial shaft and 2 guidepins were placed for the tibial tracking censor was placed.  We then registered the mechanical axis and femoral and tibial landmarks for the Teresita Robot.  We then assessed the flexion extension gaps and the flexion and extension deformity.  We made our surgical plan and then executed the distal femoral cut, the 4-in-1 femoral cut and the tibial cut. The patella was then everted, resected, and sized.  Drill holes for the patella was made.  At this point the spacer block was placed in flexion extension and fit well.     We then placed the knee in flexion where laminar spreaders were used to open the flexion gaps.  The menisci were removed.   Osteophytes were removed from the posterior femur. The posterior capsule was injected with anesthetic cocktail.  At this point, the appropriately sized tibial tray was chosen.  This was pinned into place in line with the medial one third of the tibial tubercle. We then placed the trial femur. The trial insert was placed and the knee was brought to full extension. It was stable to varus and valgus stress.   The knee was then trialed with range of motion again. The femoral drill holes were made. The tibia was finished with the drill and the punch.  The components were removed. The knee was irrigated and dried. The cement was mixed and the tibia and femur were cemented into place.  The medial congruent spacer was then inserted.  The patella was cemented into place. The knee was irrigated with Irrisept and normal saline Pulsavac lavage.  The medial congruent polyethylene was inserted. The knee was stable to varus and valgus stress. There was good balance to the ligaments medially and laterally. There was good flexion with a stable patella. At this point, the tourniquet was released.  There was minimal bleeding controlled with electrocautery.   The arthrotomy was closed with #1 Vicryl at 30 degrees of flexion, the subcutaneous tissues with 2-0 Vicryl, and the skin with staples.  The percutaneous stab incisions on the tibia were closed with 3-0 nylon in horizontal mattress fashion.  These incisions were covered with Xeroform, 4 x 4, Tegaderm.  An Aquacel dressing was placed and an Ace wrap was placed. Patient awoke from anesthesia in stable condition. There were no complications. All counts were correct.       Assistant: Josephine Hines RN; Vinnie Brandt PA  was responsible for performing the following activities: Retraction, Suction, Irrigation, and Placing Dressing and their skilled assistance was necessary for the success of this case.    Jhonathan Morales MD     Date: 10/16/2024  Time: 16:04 EDT

## 2024-10-16 NOTE — CONSULTS
[facility]   HOSPITALIST HISTORY AND PHYSICAL  Date: 10/16/2024   Patient Name: Patricia Whyte  : 1960  MRN: [id number]  Primary Care Physician:  Puja Santiago APRN  Date of admission: 10/16/2024    Subjective   Subjective   Chief Complaint: Medical management    HPI:    Patricia Whyte is a 64 y.o. female who is being seen by hospitalist for general medical management of chronic medical issues including dyslipidemia, GERD, sleep apnea (does not have a home CPAP machine), and tobacco use.  She also has history of OA left knee and she had her left knee replaced 10/16/24 by Dr. Morales.  Current vital signs include sats 94% on 2 L, /62, RR 12, P70 8 bpm, T98.7.  Recent lab work includes A1c 6.0, creatinine 0.82, potassium 4.9, Hgb 16.3.  No recent lipid panel noted.    10/16/2024  Very pleasant lady.  No distress currently.  She is alert and conversational.  Resting comfortably in recliner.   and daughter at bedside.  Left lower extremity still under effects of local anesthesia  No chest pains palpitation shortness of air reported.    Pertinent History   Past Medical History:    Active Ambulatory Problems     Diagnosis Date Noted    Primary osteoarthritis of left knee 2022    Osteoarthritis of left knee, unspecified osteoarthritis type 2024     Resolved Ambulatory Problems     Diagnosis Date Noted    No Resolved Ambulatory Problems     Past Medical History:   Diagnosis Date    Cancer     GERD (gastroesophageal reflux disease)     History of fusion of cervical spine     Hyperlipidemia     IBS (irritable bowel syndrome)     Osteoarthritis     Seasonal allergies     Sleep apnea        Past Surgical History:    Past Surgical History:   Procedure Laterality Date    ANTERIOR CERVICAL FUSION       SECTION      CHOLECYSTECTOMY      COLON RESECTION      HYSTERECTOMY      POSTERIOR CERVICAL FUSION         Social History:   .    Lives locally with .     Manager [employer]  Smokes half pack  Social History     Socioeconomic History    Marital status:    Tobacco Use    Smoking status: Every Day     Current packs/day: 0.50     Types: Cigarettes    Smokeless tobacco: Never    Tobacco comments:     Last smoked last pm 2200   Vaping Use    Vaping status: Never Used   Substance and Sexual Activity    Alcohol use: Not Currently    Drug use: Never    Sexual activity: Defer       Family History:     History reviewed. No pertinent family history.    Home Medications (reported)  Current Outpatient Medications   Medication Instructions    albuterol sulfate  (90 Base) MCG/ACT inhaler 2 puffs, Inhalation, Every 4 Hours PRN    atorvastatin (LIPITOR) 10 MG tablet 1 tablet, Nightly    dicyclomine (BENTYL) 20 MG tablet 1 tablet, 3 Times Daily PRN    ibuprofen (ADVIL,MOTRIN) 800 MG tablet 1 tablet, Every 8 Hours PRN    NON FORMULARY NITROGLYCERIN 0.125%  OINTMENT  APPLY A PEA  SIZED AMOUNT PER RECTUM TID PRN    omeprazole (priLOSEC) 20 MG capsule 1 capsule, Oral, Daily PRN    Spacer/Aero-Holding Chambers (AeroChamber MV) inhaler Use as instructed    vitamin D (ERGOCALCIFEROL) 1.25 MG (84801 UT) capsule capsule 1 capsule, Weekly       Allergies:  No Known Allergies    REVIEW OF SYSTEMS:   Left knee pain    Objective   Objective   Vitals:   Temp:  [96.9 °F (36.1 °C)-98.8 °F (37.1 °C)] 98.7 °F (37.1 °C)  Heart Rate:  [] 78  Resp:  [12-20] 12  BP: ()/(46-75) 106/62  Flow (L/min) (Oxygen Therapy):  [2-6] 2  FiO2 (%):  [56 %] 56 %  PHYSICAL EXAM   CON: WN. WD. NAD.   NECK:  No thyromegaly. No stridor. Trachea midline.  RESP:  CTA. No wheezes. No crackles.  No work of breathing or tachypnea.   CV:  Rhythm regular. Rate WNL. No murmur noted.  No edema.  GI:  Soft and nontender. Nondistended.  EXT: Left knee dressing intact  PSYCH:  Alert. Oriented. Normal affect and mood.  NEURO:  No dysarthria or aphasia. No unilateral weakness or  paresthesia.  SKIN: No chronic venous stasis changes or varicosities.  No cellulitis    Result Review    Result Review:  I have personally reviewed the results from the time of this admission to 10/16/2024 17:30 EDT and agree with these findings:  []  Laboratory  []  Microbiology  []  Radiology  []  EKG/Telemetry   []  Cardiology/Vascular   []  Pathology  []  Old records  []  Other:    Assessment & Plan   Assessment / Plan   Assessment:    Medical management  Dyslipidemia  KAREN  GERD  Irritable bowel syndrome  Tobacco use  OA left knee  Left knee replacement 10/16/24 by Dr. Morales     Plan:    Gentle IV fluid hydration.  Check basic metabolic panel in the morning.  Monitor BP trend.  Monitor for any orthostasis/lightheadedness.  Continuous pulse oximetry and end-tidal capnography  Incentive spirometer encouraged  Continue PPI  Continue statin  As needed DuoNebs  Habitrol patch will be offered  Recommend daily physical therapy  Pain med Rx per orthopedist  DVT prophylaxis per orthopedist    VTE Prophylaxis:  Pharmacologic VTE prophylaxis orders are signed & held. Mechanical VTE prophylaxis orders are present.      CODE STATUS:         Electronically signed by NICOLÁS Oakes, 10/16/24, 5:30 PM EDT.

## 2024-10-16 NOTE — ANESTHESIA PROCEDURE NOTES
Peripheral Block      Patient reassessed immediately prior to procedure    Patient location during procedure: pre-op  Start time: 10/16/2024 1:29 PM  Stop time: 10/16/2024 1:33 PM  Reason for block: at surgeon's request and post-op pain management  Performed by  Anesthesiologist: Juan Pelayo MD  Preanesthetic Checklist  Completed: patient identified, IV checked, site marked, risks and benefits discussed, surgical consent, monitors and equipment checked, pre-op evaluation and timeout performed  Prep:  Pt Position: supine  Sterile barriers:alcohol skin prep, partial drape, cap, washed/disinfected hands, mask and gloves  Prep: ChloraPrep  Patient monitoring: blood pressure monitoring, continuous pulse oximetry and EKG  Procedure    Sedation: yes  Performed under: local infiltration  Guidance:ultrasound guided and nerve stimulator    ULTRASOUND INTERPRETATION.  Using ultrasound guidance a 20 G gauge needle was placed in close proximity to the nerve, at which point, under ultrasound guidance anesthetic was injected in the area of the nerve and spread of the anesthesia was seen on ultrasound in close proximity thereto.  There were no abnormalities seen on ultrasound; a digital image was taken; and the patient tolerated the procedure with no complications. Images:still images obtained, printed/placed on chart    Laterality:left  Block Type:adductor canal block  Injection Technique:single-shot  Needle Type:echogenic  Needle Gauge:20 G (4in)  Resistance on Injection: none    Medications Used: bupivacaine-EPINEPHrine PF (MARCAINE w/EPI) 0.5% -[id number] injection - Injection   30 mL - 10/16/2024 1:33:00 PM      Post Assessment  Injection Assessment: negative aspiration for heme, no paresthesia on injection and incremental injection  Patient Tolerance:comfortable throughout block  Complications:no  Additional Notes  The block or continuous infusion is requested by the referring physician for management of postoperative  pain, or pain related to a procedure. Ultrasound guidance (deemed medically necessary). Painless injection, pt was awake and conversant during the procedure without complications. Needle and surrounding structures visualized throughout procedure. No adverse reactions or complications seen during this period. Post-procedure image showed no signs of complication, and anatomy was consistent with an uncomplicated nerve blockade.  Performed by: Juan Pelayo MD

## 2024-10-16 NOTE — PLAN OF CARE
Goal Outcome Evaluation:   Patient has had no new changes throughout shift and continues to remain stable. Patient had left TKA performed this afternoon with the janet by Dr. Morales. Patient has had no complaints of pain since arriving to the floor. Patient has not yet voided. Patient is x2 assist with walker and gait belt. Patient was not able to ambulate when arrived to the floor. Patient experiences buckling. Patient plans to DC home tomorrow with outpatient therapy and meds to bed. Patient will need DME walker and BSC. Patient's spouse will be ride.

## 2024-10-17 LAB
ANION GAP SERPL CALCULATED.3IONS-SCNC: 8.1 MMOL/L (ref 5–15)
BUN SERPL-MCNC: 14 MG/DL (ref 8–23)
BUN/CREAT SERPL: 17.3 (ref 7–25)
CALCIUM SPEC-SCNC: 8.2 MG/DL (ref 8.6–10.5)
CHLORIDE SERPL-SCNC: 105 MMOL/L (ref 98–107)
CO2 SERPL-SCNC: 23.9 MMOL/L (ref 22–29)
CREAT SERPL-MCNC: 0.81 MG/DL (ref 0.57–1)
DEPRECATED RDW RBC AUTO: 44.8 FL (ref 37–54)
EGFRCR SERPLBLD CKD-EPI 2021: 82.2 ML/MIN/1.73
ERYTHROCYTE [DISTWIDTH] IN BLOOD BY AUTOMATED COUNT: 12.5 % (ref 12.3–15.4)
GLUCOSE SERPL-MCNC: 126 MG/DL (ref 65–99)
HCT VFR BLD AUTO: 39.8 % (ref 34–46.6)
HGB BLD-MCNC: 13.1 G/DL (ref 12–15.9)
MCH RBC QN AUTO: 31.9 PG (ref 26.6–33)
MCHC RBC AUTO-ENTMCNC: 32.9 G/DL (ref 31.5–35.7)
MCV RBC AUTO: 96.8 FL (ref 79–97)
PLATELET # BLD AUTO: 204 10*3/MM3 (ref 140–450)
PMV BLD AUTO: 10.7 FL (ref 6–12)
POTASSIUM SERPL-SCNC: 4.6 MMOL/L (ref 3.5–5.2)
RBC # BLD AUTO: 4.11 10*6/MM3 (ref 3.77–5.28)
SODIUM SERPL-SCNC: 137 MMOL/L (ref 136–145)
WBC NRBC COR # BLD AUTO: 15.51 10*3/MM3 (ref 3.4–10.8)

## 2024-10-17 PROCEDURE — 25810000003 SODIUM CHLORIDE 0.9 % SOLUTION: Performed by: ORTHOPAEDIC SURGERY

## 2024-10-17 PROCEDURE — 97150 GROUP THERAPEUTIC PROCEDURES: CPT

## 2024-10-17 PROCEDURE — 97116 GAIT TRAINING THERAPY: CPT

## 2024-10-17 PROCEDURE — 99213 OFFICE O/P EST LOW 20 MIN: CPT | Performed by: PHYSICIAN ASSISTANT

## 2024-10-17 PROCEDURE — 97161 PT EVAL LOW COMPLEX 20 MIN: CPT

## 2024-10-17 PROCEDURE — 25010000002 CEFAZOLIN PER 500 MG: Performed by: ORTHOPAEDIC SURGERY

## 2024-10-17 PROCEDURE — 80048 BASIC METABOLIC PNL TOTAL CA: CPT | Performed by: PHYSICIAN ASSISTANT

## 2024-10-17 PROCEDURE — 25010000002 KETOROLAC TROMETHAMINE PER 15 MG: Performed by: ORTHOPAEDIC SURGERY

## 2024-10-17 PROCEDURE — 85027 COMPLETE CBC AUTOMATED: CPT | Performed by: PHYSICIAN ASSISTANT

## 2024-10-17 PROCEDURE — 97535 SELF CARE MNGMENT TRAINING: CPT

## 2024-10-17 PROCEDURE — 97165 OT EVAL LOW COMPLEX 30 MIN: CPT

## 2024-10-17 RX ORDER — HYDROCODONE BITARTRATE AND ACETAMINOPHEN 7.5; 325 MG/1; MG/1
1 TABLET ORAL EVERY 4 HOURS PRN
Qty: 40 TABLET | Refills: 0 | Status: SHIPPED | OUTPATIENT
Start: 2024-10-17

## 2024-10-17 RX ORDER — ASPIRIN 325 MG
325 TABLET, DELAYED RELEASE (ENTERIC COATED) ORAL DAILY
Qty: 14 TABLET | Refills: 0 | Status: SHIPPED | OUTPATIENT
Start: 2024-10-25

## 2024-10-17 RX ORDER — ONDANSETRON 4 MG/1
4 TABLET, ORALLY DISINTEGRATING ORAL EVERY 8 HOURS PRN
Qty: 10 TABLET | Refills: 0 | Status: SHIPPED | OUTPATIENT
Start: 2024-10-17

## 2024-10-17 RX ADMIN — HYDROCODONE BITARTRATE AND ACETAMINOPHEN 1 TABLET: 7.5; 325 TABLET ORAL at 03:33

## 2024-10-17 RX ADMIN — APIXABAN 2.5 MG: 2.5 TABLET, FILM COATED ORAL at 21:07

## 2024-10-17 RX ADMIN — APIXABAN 2.5 MG: 2.5 TABLET, FILM COATED ORAL at 09:08

## 2024-10-17 RX ADMIN — NICOTINE 1 PATCH: 14 PATCH, EXTENDED RELEASE TRANSDERMAL at 09:11

## 2024-10-17 RX ADMIN — SODIUM CHLORIDE 100 ML/HR: 9 INJECTION, SOLUTION INTRAVENOUS at 04:39

## 2024-10-17 RX ADMIN — PANTOPRAZOLE SODIUM 40 MG: 40 TABLET, DELAYED RELEASE ORAL at 09:07

## 2024-10-17 RX ADMIN — ACETAMINOPHEN 1000 MG: 500 TABLET ORAL at 12:26

## 2024-10-17 RX ADMIN — KETOROLAC TROMETHAMINE 15 MG: 15 INJECTION, SOLUTION INTRAMUSCULAR; INTRAVENOUS at 12:26

## 2024-10-17 RX ADMIN — CEFAZOLIN 2 G: 2 INJECTION, POWDER, FOR SOLUTION INTRAVENOUS at 06:19

## 2024-10-17 RX ADMIN — ATORVASTATIN CALCIUM 10 MG: 10 TABLET, FILM COATED ORAL at 21:07

## 2024-10-17 RX ADMIN — HYDROCODONE BITARTRATE AND ACETAMINOPHEN 2 TABLET: 7.5; 325 TABLET ORAL at 21:07

## 2024-10-17 RX ADMIN — KETOROLAC TROMETHAMINE 15 MG: 15 INJECTION, SOLUTION INTRAMUSCULAR; INTRAVENOUS at 06:19

## 2024-10-17 NOTE — PROGRESS NOTES
[facility]   Hospitalist Progress Note       Patient Name: Patricia Whyte  : 1960  MRN: [id number]  Primary Care Physician: Puja Santiago APRN  Date of admission: 10/16/2024  Today's Date: 10/17/2024  Room / Bed:   237/1  Subjective   Chief Complaint: Medical management     HPI:     Patricia Whyte is a 64 y.o. female who is being seen by hospitalist for general medical management of chronic medical issues including dyslipidemia, GERD, sleep apnea (does not have a home CPAP machine), and tobacco use.  She also has history of OA left knee and she had her left knee replaced 10/16/24 by Dr. Morales.  Current vital signs include sats 94% on 2 L, /62, RR 12, P70 8 bpm, T98.7.  Recent lab work includes A1c 6.0, creatinine 0.82, potassium 4.9, Hgb 16.3.  No recent lipid panel noted.       Interval Followup: 10/17/2024    Daughter at bedside  No capitation's, chest pains or shortness of air  No nausea or vomiting today  Morning creatinine 0.8  Blood pressure 108//64  Sats 96% on room air  No trouble voiding bladder  She is having some difficulties with physical therapy  Left lower extremity still numb with weakness and buckling.  Nerve block from recent surgery has not worn off completely.  Some concerns about returning home safely today.        REVIEW OF SYSTEMS:   Left knee pain  Objective   Temp:  [96.9 °F (36.1 °C)-98.7 °F (37.1 °C)] 98.1 °F (36.7 °C)  Heart Rate:  [] 61  Resp:  [12-18] 16  BP: ()/(46-79) 129/64  Flow (L/min) (Oxygen Therapy):  [2-6] 2  FiO2 (%):  [56 %] 56 %  PHYSICAL EXAM   CON: WN. WD. NAD.   NECK:  No thyromegaly. No stridor.   RESP:  CTA. No wheezes. No crackles.    CV:  Rhythm regular. Rate WNL. No murmur noted.  No edema.  GI:  Soft and nontender. Nondistended.    EXT: Left knee dressing intact  PSYCH:  Alert. Oriented. Normal affect and mood.  NEURO:  No dysarthria or aphasia.   SKIN: No chronic venous stasis changes or varicosities.  No  cellulitis  Results from last 7 days   Lab Units 10/17/24  0419   WBC 10*3/mm3 15.51*   HEMOGLOBIN g/dL 13.1   HEMATOCRIT % 39.8   PLATELETS 10*3/mm3 204     Results from last 7 days   Lab Units 10/17/24  0419   SODIUM mmol/L 137   POTASSIUM mmol/L 4.6   CO2 mmol/L 23.9   CHLORIDE mmol/L 105   ANION GAP mmol/L 8.1   BUN mg/dL 14   CREATININE mg/dL 0.81   GLUCOSE mg/dL 126*         COMPLEXITY OF DATA / DECISION MAKING     []  Moderate: One acute illness or mild exacerbation of chronic or 2 stable chronic or tx side effects   []  High:  Severe acute illness or severe exacerbation of chronic - potential for major debility / life threatening         I have personally reviewed the results from the time of this admission to 10/17/2024 14:49 EDT:  []  Laboratory:  []  Microbiology: []  Radiology:  []  Telemetry:   []  Cardiology/Vascular:  []  Pathology:  []  Prior external records:  []  Independent historian provided additional details:      []  Discussed case with specialists:    []  Independent interpretation of ECG/Imaging etc:             []  Moderate: Rx management, low risk surgery, suboptimal social situation   []  High:  Rx with close monitoring for toxicity, mod-high risk surgery, DNR decision, Comfort initiated, IV pain meds    Assessment / Plan   Assessment:     Medical management  Dyslipidemia  KAREN  GERD  Irritable bowel syndrome  Tobacco use  OA left knee  Left knee replacement 10/16/24 by Dr. Morales     Plan:     Continue to monitor BP trend, orthostasis/lightheadedness.  Incentive spirometer encouraged  Continue PPI  Continue statin  As needed DuoNebs  Habitrol patch will be offered  Recommend daily physical therapy  Pain med Rx per orthopedist  DVT prophylaxis per orthopedist  VTE Prophylaxis:  Pharmacologic & mechanical VTE prophylaxis orders are present.      CODE STATUS:      Code Status (Patient has no pulse and is not breathing): CPR (Attempt to Resuscitate)  Medical Interventions (Patient has  pulse or is breathing): Full Support       Electronically signed by NICOLÁS Oakes, 10/17/24, 2:49 PM EDT.

## 2024-10-17 NOTE — SIGNIFICANT NOTE
10/17/24 0759   Plan   Final Discharge Disposition Code 01 - home or self-care   Final Note JONI Baez. Appt:10/18/24 at 11AM.

## 2024-10-17 NOTE — THERAPY EVALUATION
Patient Name: Patricia Whyte  : 1960    MRN: [id number]                              Today's Date: 10/17/2024       Admit Date: 10/16/2024    Visit Dx:     ICD-10-CM ICD-9-CM   1. Difficulty in walking  R26.2 719.7   2. Primary osteoarthritis of left knee  M17.12 715.16   3. Decreased activities of daily living (ADL)  Z78.9 V49.89     Patient Active Problem List   Diagnosis    Primary osteoarthritis of left knee    Osteoarthritis of left knee, unspecified osteoarthritis type     Past Medical History:   Diagnosis Date    Cancer     cervical cancer-treated with chemo and radiation    GERD (gastroesophageal reflux disease)     History of fusion of cervical spine     Hyperlipidemia     IBS (irritable bowel syndrome)     Osteoarthritis     Seasonal allergies     Sleep apnea      Past Surgical History:   Procedure Laterality Date    ANTERIOR CERVICAL FUSION       SECTION      CHOLECYSTECTOMY      COLON RESECTION      HYSTERECTOMY      POSTERIOR CERVICAL FUSION      TOTAL KNEE ARTHROPLASTY Left 10/16/2024    Procedure: LEFT TOTAL KNEE ARTHROPLASTY WITH DIALLO ROBOT;  Surgeon: Jhonathan Morales MD;  Location: [facility] MAIN OR;  Service: Robotics - Ortho;  Laterality: Left;      General Information       Row Name 10/17/24 1136 10/17/24 1129       OT Time and Intention    Document Type therapy note (daily note)  -AV evaluation  -AV    Mode of Treatment individual therapy;occupational therapy  -AV individual therapy;occupational therapy  -AV      Row Name 10/17/24 1136 10/17/24 1129       General Information    Patient Profile Reviewed -- yes  -AV    Prior Level of Function -- independent:;ADL's;home management;transfer;all household mobility  Stands to shower (walk-in without DME).  Stands at sink to groom.  Standard commode.  Ambulates without assistive device.  No home oxygen.  -AV    Existing Precautions/Restrictions -- fall  WBAT  -AV    Barriers to Rehab --  None: Pleasant and motivated  -AV none  identified  -AV      Row Name 10/17/24 1129          Occupational Profile    Reason for Services/Referral (Occupational Profile) Patient is a 64 year old female admitted to [facility] on 10/16/2024 with left knee pain.  She is currently post-op day 1: Total knee replacement.  She is on 2 N./room air.   OT consulted due to recent decline in ADL/transfer independence.  No previous OT services for current condition.  -AV       Row Name 10/17/24 1129          Living Environment    People in Home --   and cousin  -AV       Row Name 10/17/24 1129          Home Main Entrance    Number of Stairs, Main Entrance five  -AV       Row Name 10/17/24 1129          Stairs Within Home, Primary    Stairs, Within Home, Primary Non-essential basement stairs  -AV       Row Name 10/17/24 1136 10/17/24 1129       Cognition    Orientation Status (Cognition) --  Receptive to cues for safe positioning and adaptive techniques throughout functional ADL session  -AV --  Patient is alert, pleasant and cooperative- able to retain information and follow commands.  -AV      Row Name 10/17/24 1129          Safety Issues/Impairments Affecting Functional Mobility    Impairments Affecting Function (Mobility) balance;endurance/activity tolerance  -AV               User Key  (r) = Recorded By, (t) = Taken By, (c) = Cosigned By      Initials Name Provider Type    Zay Hanson OT Occupational Therapist                     Mobility/ADL's       Row Name 10/17/24 1131          Transfers    Comment, (Transfers) Assist of 2 bedside commode transfer just prior to OT session.  -AV       Row Name 10/17/24 1137 10/17/24 1131       Activities of Daily Living    BADL Assessment/Intervention --  Independent upper body bathing/dressing with set up.  SBA lower body bathing/don/doff pants w adaptive positioning for safety.  Independent donning/doffing slipper socks (patient preference for footwear).  -AV --  Independent feeding, grooming and upper  body bathing/dressing with set up while seated.  SBA lower body bathing/dressing with adaptive positioning/technique.  CGA toilet hygiene using BSC.  -AV              User Key  (r) = Recorded By, (t) = Taken By, (c) = Cosigned By      Initials Name Provider Type    Zay Hanson OT Occupational Therapist                   Obj/Interventions       Row Name 10/17/24 1131          Sensory Assessment (Somatosensory)    Sensory Assessment (Somatosensory) UE sensation intact  -AV       Row Name 10/17/24 1131          Vision Assessment/Intervention    Visual Impairment/Limitations WFL;corrective lenses for reading  -AV       Row Name 10/17/24 1131          Range of Motion Comprehensive    General Range of Motion bilateral upper extremity ROM WFL  -AV     Comment, General Range of Motion AROM  -AV       Row Name 10/17/24 1131          Strength Comprehensive (MMT)    Comment, General Manual Muscle Testing (MMT) Assessment 4+/5 bilateral biceps, triceps and   -AV       Row Name 10/17/24 1131          Motor Skills    Motor Skills coordination;functional endurance  -AV     Coordination WFL  Right dominant  -AV     Functional Endurance Fair minus  -AV       Row Name 10/17/24 1138 10/17/24 1131       Balance    Balance Assessment sitting dynamic balance  -AV --    Dynamic Sitting Balance independent  -AV --    Comment, Balance -- Impaired  -AV              User Key  (r) = Recorded By, (t) = Taken By, (c) = Cosigned By      Initials Name Provider Type    Zay Hanson OT Occupational Therapist                   Goals/Plan       Row Name 10/17/24 1133          Transfer Goal 1 (OT)    Activity/Assistive Device (Transfer Goal 1, OT) transfers, all;walker, rolling  -AV     [place] Level/Cues Needed (Transfer Goal 1, OT) modified independence  -AV     Time Frame (Transfer Goal 1, OT) long term goal (LTG);10 days  -AV       Row Name 10/17/24 1133          Bathing Goal 1 (OT)    Activity/Device (Bathing Goal 1, OT)  bathing skills, all;shower chair  -AV     Parke Level/Cues Needed (Bathing Goal 1, OT) modified independence  -AV     Time Frame (Bathing Goal 1, OT) long term goal (LTG);10 days  -AV       Row Name 10/17/24 1133          Dressing Goal 1 (OT)    Activity/Device (Dressing Goal 1, OT) dressing skills, all  -AV     Parke/Cues Needed (Dressing Goal 1, OT) modified independence  -AV     Time Frame (Dressing Goal 1, OT) long term goal (LTG);10 days  -AV       Row Name 10/17/24 1133          Toileting Goal 1 (OT)    Activity/Device (Toileting Goal 1, OT) toileting skills, all;raised toilet seat  -AV     Parke Level/Cues Needed (Toileting Goal 1, OT) modified independence  -AV     Time Frame (Toileting Goal 1, OT) long term goal (LTG);10 days  -AV       Row Name 10/17/24 1133          Grooming Goal 1 (OT)    Activity/Device (Grooming Goal 1, OT) grooming skills, all  Standing at sink  -AV     Parke (Grooming Goal 1, OT) modified independence  -AV     Time Frame (Grooming Goal 1, OT) long term goal (LTG);10 days  -AV       Row Name 10/17/24 1133          Problem Specific Goal 1 (OT)    Problem Specific Goal 1 (OT) Patient will demonstrate good standing endurance/activity tolerance needed to support ADLs.  -AV     Time Frame (Problem Specific Goal 1, OT) long term goal (LTG);10 days  -AV       Row Name 10/17/24 1133          Therapy Assessment/Plan (OT)    Planned Therapy Interventions (OT) activity tolerance training;BADL retraining;functional balance retraining;occupation/activity based interventions;patient/caregiver education/training;transfer/mobility retraining  -AV               User Key  (r) = Recorded By, (t) = Taken By, (c) = Cosigned By      Initials Name Provider Type    Zay Hanson OT Occupational Therapist                   Clinical Impression       Row Name 10/17/24 1132          Pain Assessment    Additional Documentation Pain Scale: FACES Pre/Post-Treatment (Group)  -AV        Row Name 10/17/24 1132          Pain Scale: FACES Pre/Post-Treatment    Pain: FACES Scale, Pretreatment 0-->no hurt  -AV     Posttreatment Pain Rating 0-->no hurt  -AV       Row Name 10/17/24 1132          Plan of Care Review    Plan of Care Reviewed With patient;daughter  -AV     Progress improving  Through ADL retraining, patient is now able to perform basic ADLs SBA with set up and cues for safe positioning and adaptive techniques.  -AV     Outcome Evaluation Patient presents with limitations of balance and endurance/activity tolerance which impede her ability to perform ADL and transfers as prior.  The skills of a therapist will be required to safely and effectively implement treatment plan to restore maximal level of function.  -AV       Row Name 10/17/24 1132          Therapy Assessment/Plan (OT)    Patient/Family Therapy Goal Statement (OT) Long walks without pain  -AV     Rehab Potential (OT) good  -AV     Criteria for Skilled Therapeutic Interventions Met (OT) yes;meets criteria;skilled treatment is necessary  -AV     Therapy Frequency (OT) 5 times/wk  -AV       Row Name 10/17/24 1132          Therapy Plan Review/Discharge Plan (OT)    Equipment Needs Upon Discharge (OT) walker, rolling;commode chair;shower chair  -AV     Anticipated Discharge Disposition (OT) home with assist;home with outpatient therapy services  -AV       Row Name 10/17/24 1132          Vital Signs    O2 Delivery Pre Treatment room air  -AV     O2 Delivery Intra Treatment room air  -AV     O2 Delivery Post Treatment room air  -AV       Row Name 10/17/24 1132          Positioning and Restraints    Pre-Treatment Position sitting in chair/recliner  -AV     Post Treatment Position chair  -AV     In Chair reclined;call light within reach;encouraged to call for assist;exit alarm on;with family/caregiver  Daughter  -AV               User Key  (r) = Recorded By, (t) = Taken By, (c) = Cosigned By      Initials Name Provider Type    AV  Zay Sher, OT Occupational Therapist                   Outcome Measures       Row Name 10/17/24 1134          How much help from another is currently needed...    Putting on and taking off regular lower body clothing? 3  -AV     Bathing (including washing, rinsing, and drying) 3  -AV     Toileting (which includes using toilet bed pan or urinal) 3  -AV     Putting on and taking off regular upper body clothing 4  -AV     Taking care of personal grooming (such as brushing teeth) 4  -AV     Eating meals 4  -AV     AM-PAC 6 Clicks Score (OT) 21  -AV       Row Name 10/17/24 1000          How much help from another person do you currently need...    Turning from your back to your side while in flat bed without using bedrails? 3  -MELLISA     Moving from lying on back to sitting on the side of a flat bed without bedrails? 3  -MELLISA     Moving to and from a bed to a chair (including a wheelchair)? 3  -MELLISA     Standing up from a chair using your arms (e.g., wheelchair, bedside chair)? 3  -MELLISA     Climbing 3-5 steps with a railing? 2  -MELLISA     To walk in hospital room? 2  -MELLISA     AM-PAC 6 Clicks Score (PT) 16  -MELLISA     Highest Level of Mobility Goal 5 --> Static standing  -MELLISA       Row Name 10/17/24 1134 10/17/24 1000       Functional Assessment    Outcome Measure Options AM-PAC 6 Clicks Daily Activity (OT);Optimal Instrument  -AV AM-PAC 6 Clicks Basic Mobility (PT)  -MELLISA      Row Name 10/17/24 1134          Optimal Instrument    Optimal Instrument Optimal - 3  -AV     Bending/Stooping 1  -AV     Standing 4  -AV     Reaching 1  -AV     From the list, choose the 3 activities you would most like to be able to do without any difficulty Bending/stooping;Standing;Reaching  -AV     Total Score Optimal - 3 6  -AV               User Key  (r) = Recorded By, (t) = Taken By, (c) = Cosigned By      Initials Name Provider Type    Zay Hanson, OT Occupational Therapist    Vish Tolentino, PT Physical Therapist                     Occupational Therapy Education       Title: PT OT SLP Therapies (Done)       Topic: Occupational Therapy (Done)       Point: ADL training (Done)       Description:   Instruct learner(s) on proper safety adaptation and remediation techniques during self care or transfers.   Instruct in proper use of assistive devices.                  Learning Progress Summary            Patient Acceptance, E, VU by AV at 10/17/2024 1134                      Point: Home exercise program (Done)       Description:   Instruct learner(s) on appropriate technique for monitoring, assisting and/or progressing therapeutic exercises/activities.                  Learning Progress Summary            Patient Acceptance, E, VU by AV at 10/17/2024 1134                      Point: Precautions (Done)       Description:   Instruct learner(s) on prescribed precautions during self-care and functional transfers.                  Learning Progress Summary            Patient Acceptance, E, VU by AV at 10/17/2024 1134                      Point: Body mechanics (Done)       Description:   Instruct learner(s) on proper positioning and spine alignment during self-care, functional mobility activities and/or exercises.                  Learning Progress Summary            Patient Acceptance, E, VU by AV at 10/17/2024 1134                                      User Key       Initials Effective Dates Name Provider Type Discipline     06/16/21 -  Zay Sher OT Occupational Therapist OT                  OT Recommendation and Plan  Planned Therapy Interventions (OT): activity tolerance training, BADL retraining, functional balance retraining, occupation/activity based interventions, patient/caregiver education/training, transfer/mobility retraining  Therapy Frequency (OT): 5 times/wk  Plan of Care Review  Plan of Care Reviewed With: patient, daughter  Progress: improving (Through ADL retraining, patient is now able to perform basic ADLs SBA with set up  and cues for safe positioning and adaptive techniques.)  Outcome Evaluation: Patient presents with limitations of balance and endurance/activity tolerance which impede her ability to perform ADL and transfers as prior.  The skills of a therapist will be required to safely and effectively implement treatment plan to restore maximal level of function.     Time Calculation:   Evaluation Complexity (OT)  Review Occupational Profile/Medical/Therapy History Complexity: expanded/moderate complexity  Assessment, Occupational Performance/Identification of Deficit Complexity: 1-3 performance deficits  Clinical Decision Making Complexity (OT): problem focused assessment/low complexity  Overall Complexity of Evaluation (OT): low complexity     Time Calculation- OT       Row Name 10/17/24 1135             Time Calculation- OT    OT Received On 10/17/24  -AV      OT Goal Re-Cert Due Date 10/26/24  -AV         Timed Charges    67534 - OT Self Care/Mgmt Minutes 25  -AV         Untimed Charges    OT Eval/Re-eval Minutes 32  -AV         Total Minutes    Timed Charges Total Minutes 25  -AV      Untimed Charges Total Minutes 32  -AV       Total Minutes 57  -AV                User Key  (r) = Recorded By, (t) = Taken By, (c) = Cosigned By      Initials Name Provider Type    AV Zay Sher OT Occupational Therapist                  Therapy Charges for Today       Code Description Service Date Service Provider Modifiers Qty    91553198309 HC OT SELF CARE/MGMT/TRAIN EA 15 MIN 10/17/2024 Zay Sher OT GO 2    [id number] HC OT EVAL LOW COMPLEXITY 3 10/17/2024 Zay Sher OT GO 1                 Zay Sher OT  10/17/2024

## 2024-10-17 NOTE — PLAN OF CARE
Goal Outcome Evaluation:  Plan of Care Reviewed With: patient           Outcome Evaluation: Pt presents with decreased ROM, strength, transfers and ambulation.  Skilled PT services will be required to address these mobility deficits.    Anticipated Discharge Disposition (PT): home with outpatient therapy services

## 2024-10-17 NOTE — THERAPY EVALUATION
Acute Care - Physical Therapy Initial Evaluation   [place]     Patient Name: Patricia Whyte  : 1960  MRN: [id number]  Today's Date: 10/17/2024     Admit date: 10/16/2024     Referring Physician: Jah Ochoa MD     Surgery Date:10/16/2024   Procedure(s) (LRB):  LEFT TOTAL KNEE ARTHROPLASTY WITH DIALLO ROBOT (Left)          Visit Dx:     ICD-10-CM ICD-9-CM   1. Difficulty in walking  R26.2 719.7   2. Primary osteoarthritis of left knee  M17.12 715.16     Patient Active Problem List   Diagnosis    Primary osteoarthritis of left knee    Osteoarthritis of left knee, unspecified osteoarthritis type     Past Medical History:   Diagnosis Date    Cancer     cervical cancer-treated with chemo and radiation    GERD (gastroesophageal reflux disease)     History of fusion of cervical spine     Hyperlipidemia     IBS (irritable bowel syndrome)     Osteoarthritis     Seasonal allergies     Sleep apnea      Past Surgical History:   Procedure Laterality Date    ANTERIOR CERVICAL FUSION       SECTION      CHOLECYSTECTOMY      COLON RESECTION      HYSTERECTOMY      POSTERIOR CERVICAL FUSION      TOTAL KNEE ARTHROPLASTY Left 10/16/2024    Procedure: LEFT TOTAL KNEE ARTHROPLASTY WITH DIALLO ROBOT;  Surgeon: Jhonathan Morales MD;  Location: [facility];  Service: Robotics - Ortho;  Laterality: Left;     PT Assessment (Last 12 Hours)       PT Evaluation and Treatment       Row Name 10/17/24 1000          Physical Therapy Time and Intention    Subjective Information complains of;pain  -MELLISA     Document Type evaluation  -MELLISA     Mode of Treatment individual therapy;physical therapy  -MELLISA     Patient Effort good  -MELLISA     Symptoms Noted During/After Treatment none  -MELLISA       Row Name 10/17/24 1000          General Information    Patient Observations alert;cooperative;agree to therapy  -MELLISA     Prior Level of Function independent:;all household mobility;community mobility  -MELLISA     Equipment Currently Used at Home  none  -MELLISA     Existing Precautions/Restrictions fall;weight bearing  -MELLISA     Barriers to Rehab none identified  -MELLISA       Row Name 10/17/24 1000          Living Environment    Current Living Arrangements home  -MELLISA     People in Home spouse  -MELLISA       Row Name 10/17/24 1000          Cognition    Orientation Status (Cognition) oriented x 3  -MELLISA       Row Name 10/17/24 1000          Range of Motion Comprehensive    General Range of Motion lower extremity range of motion deficits identified  L knee 0-100°  -MELLISA       Row Name 10/17/24 1000          Strength Comprehensive (MMT)    General Manual Muscle Testing (MMT) Assessment lower extremity strength deficits identified  L knee extension 1/5  -MELLISA       Row Name 10/17/24 1000          Mobility    Extremity Weight-bearing Status left lower extremity  -MELLISA     Left Lower Extremity (Weight-bearing Status) weight-bearing as tolerated (WBAT)  -MELLISA       Row Name 10/17/24 1000          Bed Mobility    Bed Mobility bed mobility (all) activities;supine-sit  -MELLISA     All Activities, [place] (Bed Mobility) standby assist  -MELLISA     Supine-Sit [place] (Bed Mobility) standby assist  -MELLISA       Row Name 10/17/24 1000          Transfers    Transfers bed-chair transfer;sit-stand transfer  -MELLISA       Row Name 10/17/24 1000          Bed-Chair Transfer    Bed-Chair [place] (Transfers) contact guard  -MELLISA     Assistive Device (Bed-Chair Transfers) walker, front-wheeled  -MELLISA       Row Name 10/17/24 1000          Sit-Stand Transfer    Sit-Stand [place] (Transfers) contact guard  -MELLISA     Assistive Device (Sit-Stand Transfers) walker, front-wheeled  -MELLISA       Row Name 10/17/24 1000          Gait/Stairs (Locomotion)    Gait/Stairs Locomotion gait/ambulation assistive device  -MELLISA     [place] Level (Gait) contact guard  -MELLISA     Assistive Device (Gait) walker, front-wheeled  -MELLISA     Patient was able to Ambulate yes  -MELLISA     Distance in Feet (Gait) 70  cueing for proper gait  pattern.  Pt knee jessica with WB due to significant postop weakness  -MELLISA       Row Name 10/17/24 1000          Safety Issues/Impairments Affecting Functional Mobility    Impairments Affecting Function (Mobility) balance;pain;range of motion (ROM);strength  -MELLISA       Row Name 10/17/24 1000          Balance    Balance Assessment standing dynamic balance  -MELLISA     Dynamic Standing Balance contact guard  -MELLISA     Position/Device Used, Standing Balance walker, front-wheeled  -MELLISA       Row Name             Wound 10/16/24 1447 Left anterior knee    Wound - Properties Group Placement Date: 10/16/24  -SC Placement Time: 1447  -SC Side: Left  -SC Orientation: anterior  -SC Location: knee  -SC Primary Wound Type: Incision  -SC    Retired Wound - Properties Group Placement Date: 10/16/24  -SC Placement Time: 1447  -SC Side: Left  -SC Orientation: anterior  -SC Location: knee  -SC Primary Wound Type: Incision  -SC    Retired Wound - Properties Group Placement Date: 10/16/24  -SC Placement Time: 1447  -SC Side: Left  -SC Orientation: anterior  -SC Location: knee  -SC Primary Wound Type: Incision  -SC    Retired Wound - Properties Group Date first assessed: 10/16/24  -SC Time first assessed: 1447  -SC Side: Left  -SC Location: knee  -SC Primary Wound Type: Incision  -SC      Row Name             Wound 10/16/24 1448 Left proximal leg    Wound - Properties Group Placement Date: 10/16/24  -SC Placement Time: 1448  -SC Side: Left  -SC Orientation: proximal  -SC Location: leg  -SC Primary Wound Type: Puncture  -SC    Retired Wound - Properties Group Placement Date: 10/16/24  -SC Placement Time: 1448  -SC Side: Left  -SC Orientation: proximal  -SC Location: leg  -SC Primary Wound Type: Puncture  -SC    Retired Wound - Properties Group Placement Date: 10/16/24  -SC Placement Time: 1448  -SC Side: Left  -SC Orientation: proximal  -SC Location: leg  -SC Primary Wound Type: Puncture  -SC    Retired Wound - Properties Group Date first  assessed: 10/16/24  -SC Time first assessed: 1448  -SC Side: Left  -SC Location: leg  -SC Primary Wound Type: Puncture  -SC      Row Name 10/17/24 1000          Plan of Care Review    Plan of Care Reviewed With patient  -MELLISA     Outcome Evaluation Pt presents with decreased ROM, strength, transfers and ambulation.  Skilled PT services will be required to address these mobility deficits.  -MELLISA       Row Name 10/17/24 1000          Therapy Assessment/Plan (PT)    Patient/Family Therapy Goals Statement (PT) Walk without pain  -MELLISA     Rehab Potential (PT) good  -MELLISA     Criteria for Skilled Interventions Met (PT) skilled treatment is necessary  -MELLISA     Therapy Frequency (PT) 2 times/day  -MELLISA     Predicted Duration of Therapy Intervention (PT) 10 days  -MELLISA     Problem List (PT) problems related to;balance;mobility;range of motion (ROM);strength;pain  -MELLISA     Activity Limitations Related to Problem List (PT) unable to ambulate safely;unable to transfer safely  -MELILSA       Row Name 10/17/24 1000          PT Evaluation Complexity    History, PT Evaluation Complexity no personal factors and/or comorbidities  -MELLISA     Examination of Body Systems (PT Eval Complexity) total of 4 or more elements  -MELLISA     Clinical Presentation (PT Evaluation Complexity) stable  -MELLISA     Clinical Decision Making (PT Evaluation Complexity) low complexity  -MELLISA     Overall Complexity (PT Evaluation Complexity) low complexity  -MELLISA       Row Name 10/17/24 1000          Therapy Plan Review/Discharge Plan (PT)    Therapy Plan Review (PT) evaluation/treatment results reviewed;participants voiced agreement with care plan;participants included;patient  -MELLISA       Row Name 10/17/24 1000          Physical Therapy Goals    Transfer Goal Selection (PT) transfer, PT goal 1  -MELLISA     Gait Training Goal Selection (PT) gait training, PT goal 1  -MELLISA     ROM Goal Selection (PT) ROM, PT goal 1  -MELLISA     Strength Goal Selection (PT) strength, PT goal 1  -MELLISA       Row Name  10/17/24 1000          Transfer Goal 1 (PT)    Activity/Assistive Device (Transfer Goal 1, PT) transfers, all  -MELLISA     [place] Level/Cues Needed (Transfer Goal 1, PT) independent  -MELLISA     Time Frame (Transfer Goal 1, PT) long term goal (LTG);10 days  -MELLISA       Row Name 10/17/24 1000          Gait Training Goal 1 (PT)    Activity/Assistive Device (Gait Training Goal 1, PT) gait (walking locomotion);walker, rolling  -MELLISA     [place] Level (Gait Training Goal 1, PT) independent  -MELLISA     Distance (Gait Training Goal 1, PT) 300  -MELLISA     Time Frame (Gait Training Goal 1, PT) long term goal (LTG);10 days  -MELLISA       Row Name 10/17/24 1000          ROM Goal 1 (PT)    ROM Goal 1 (PT) Pt will demonstrate knee ROM from 0-110° on the affected side.  -MELLISA     Time Frame (ROM Goal 1, PT) long-term goal (LTG);10 days  -MELLISA       Row Name 10/17/24 1000          Strength Goal 1 (PT)    Strength Goal 1 (PT) Pt will demonstrate knee extension strength of 5/5 on the affected side.  -MELLISA     Time Frame (Strength Goal 1, PT) long term goal (LTG);10 days  -MELLISA               User Key  (r) = Recorded By, (t) = Taken By, (c) = Cosigned By      Initials Name Provider Type    Lisa Malloy, RN Registered Nurse    Vish Tolentino PT Physical Therapist                    Physical Therapy Education       Title: PT OT SLP Therapies (Done)       Topic: Physical Therapy (Done)       Point: Mobility training (Done)       Learning Progress Summary            Patient Acceptance, E,TB, VU by MELLISA at 10/17/2024 1010                      Point: Precautions (Done)       Learning Progress Summary            Patient Acceptance, E,TB, VU by MELLISA at 10/17/2024 1010                                      User Key       Initials Effective Dates Name Provider Type [place] PAK 06/03/21 -  Vish Castro PT Physical Therapist PT                  PT Recommendation and Plan  Anticipated Discharge Disposition (PT): home with outpatient therapy  services  Planned Therapy Interventions (PT): balance training, bed mobility training, gait training, home exercise program, stair training, ROM (range of motion), strengthening, stretching, transfer training  Therapy Frequency (PT): 2 times/day  Plan of Care Reviewed With: patient  Outcome Evaluation: Pt presents with decreased ROM, strength, transfers and ambulation.  Skilled PT services will be required to address these mobility deficits.   Outcome Measures       Row Name 10/17/24 1000             How much help from another person do you currently need...    Turning from your back to your side while in flat bed without using bedrails? 3  -MELLIAS      Moving from lying on back to sitting on the side of a flat bed without bedrails? 3  -MELLISA      Moving to and from a bed to a chair (including a wheelchair)? 3  -MELLISA      Standing up from a chair using your arms (e.g., wheelchair, bedside chair)? 3  -MELLISA      Climbing 3-5 steps with a railing? 2  -MELLISA      To walk in hospital room? 2  -MELLISA      AM-PAC 6 Clicks Score (PT) 16  -MELLISA         Functional Assessment    Outcome Measure Options AM-PAC 6 Clicks Basic Mobility (PT)  -MELLISA                User Key  (r) = Recorded By, (t) = Taken By, (c) = Cosigned By      Initials Name Provider Type    Vish Tolentino PT Physical Therapist                     Time Calculation:    PT Charges       Row Name 10/17/24 1008             Time Calculation    PT Received On 10/17/24  -MELLISA      PT Goal Re-Cert Due Date 10/26/24  -MELLISA         Untimed Charges    PT Eval/Re-eval Minutes 30  -MELLISA         Total Minutes    Untimed Charges Total Minutes 30  -MELLISA       Total Minutes 30  -MELLISA                User Key  (r) = Recorded By, (t) = Taken By, (c) = Cosigned By      Initials Name Provider Type    Vish Tolentino PT Physical Therapist                      PT G-Codes  Outcome Measure Options: AM-PAC 6 Clicks Basic Mobility (PT)  AM-PAC 6 Clicks Score (PT): 16    Vish Castro, PT  10/17/2024

## 2024-10-17 NOTE — THERAPY TREATMENT NOTE
Acute Care - Physical Therapy Treatment Note   [place]     Patient Name: Patricia Whyte  : 1960  MRN: [id number]  Today's Date: 10/17/2024      Visit Dx:     ICD-10-CM ICD-9-CM   1. Difficulty in walking  R26.2 719.7   2. Primary osteoarthritis of left knee  M17.12 715.16   3. Decreased activities of daily living (ADL)  Z78.9 V49.89     Patient Active Problem List   Diagnosis    Primary osteoarthritis of left knee    Osteoarthritis of left knee, unspecified osteoarthritis type     Past Medical History:   Diagnosis Date    Cancer     cervical cancer-treated with chemo and radiation    GERD (gastroesophageal reflux disease)     History of fusion of cervical spine     Hyperlipidemia     IBS (irritable bowel syndrome)     Osteoarthritis     Seasonal allergies     Sleep apnea      Past Surgical History:   Procedure Laterality Date    ANTERIOR CERVICAL FUSION       SECTION      CHOLECYSTECTOMY      COLON RESECTION      HYSTERECTOMY      POSTERIOR CERVICAL FUSION      TOTAL KNEE ARTHROPLASTY Left 10/16/2024    Procedure: LEFT TOTAL KNEE ARTHROPLASTY WITH DIALLO ROBOT;  Surgeon: Jhonathan Morales MD;  Location: [facility] MAIN OR;  Service: Robotics - Ortho;  Laterality: Left;     PT Assessment (Last 12 Hours)       PT Evaluation and Treatment       Row Name 10/17/24 3710 10/17/24 1200       Physical Therapy Time and Intention    Subjective Information complains of;pain  -RH complains of;weakness  -RH    Document Type therapy note (daily note)  -RH therapy note (daily note)  -    Mode of Treatment individual therapy;group therapy;physical therapy  -RH physical therapy;group therapy;individual therapy  -RH    Patient Effort good  -RH good  -RH    Comment Gait performed individually; therapuetic exercises performed in a group session with 2 participants  -RH Gait performed individually; therapuetic exercises performed in a group session with 5 participants  -RH      Row Name 10/17/24 1000          Physical  Therapy Time and Intention    Subjective Information complains of;pain  -MELLISA     Document Type evaluation  -MELLISA     Mode of Treatment individual therapy;physical therapy  -MELLISA     Patient Effort good  -MELLISA     Symptoms Noted During/After Treatment none  -MELLISA       Row Name 10/17/24 1450 10/17/24 1200       General Information    Patient Observations alert;cooperative;agree to therapy  -RH alert;cooperative;agree to therapy  -RH      Row Name 10/17/24 1000          General Information    Patient Observations alert;cooperative;agree to therapy  -MELLISA     Prior Level of Function independent:;all household mobility;community mobility  -MELLISA     Equipment Currently Used at Home none  -MELLISA     Existing Precautions/Restrictions fall;weight bearing  -MELLISA     Barriers to Rehab none identified  -MELLISA       Row Name 10/17/24 1000          Living Environment    Current Living Arrangements home  -MELLISA     People in Home spouse  -MELLISA       Row Name 10/17/24 1450          Pain    Pain Location knee  -RH     Pain Side/Orientation left  -RH       Row Name 10/17/24 1450          Pain Scale: FACES Pre/Post-Treatment    Pain: FACES Scale, Pretreatment 2-->hurts little bit  -RH     Posttreatment Pain Rating 2-->hurts little bit  -RH       Row Name 10/17/24 1000          Cognition    Orientation Status (Cognition) oriented x 3  -MELLISA       Row Name 10/17/24 1000          Range of Motion Comprehensive    General Range of Motion lower extremity range of motion deficits identified  L knee 0-100°  -MELLISA       Row Name 10/17/24 1000          Strength Comprehensive (MMT)    General Manual Muscle Testing (MMT) Assessment lower extremity strength deficits identified  L knee extension 1/5  -MELLISA       Row Name 10/17/24 1450 10/17/24 1200       Mobility    Extremity Weight-bearing Status left lower extremity  -RH left lower extremity  -RH    Left Lower Extremity (Weight-bearing Status) weight-bearing as tolerated (WBAT)  -RH weight-bearing as tolerated (WBAT)  -RH       Row Name 10/17/24 1000          Mobility    Extremity Weight-bearing Status left lower extremity  -MELLISA     Left Lower Extremity (Weight-bearing Status) weight-bearing as tolerated (WBAT)  -MELLISA       Row Name 10/17/24 1000          Bed Mobility    Bed Mobility bed mobility (all) activities;supine-sit  -MELLISA     All Activities, Iron (Bed Mobility) standby assist  -MELLISA     Supine-Sit Iron (Bed Mobility) standby assist  -MELLISA       Row Name 10/17/24 1450 10/17/24 1200       Transfers    Transfers sit-stand transfer;stand-sit transfer  -RH sit-stand transfer;stand-sit transfer  -RH      Row Name 10/17/24 1000          Transfers    Transfers bed-chair transfer;sit-stand transfer  -MELLISA       Row Name 10/17/24 1000          Bed-Chair Transfer    Bed-Chair Iron (Transfers) contact guard  -MELLISA     Assistive Device (Bed-Chair Transfers) walker, front-wheeled  -MELLISA       Row Name 10/17/24 1450 10/17/24 1200       Sit-Stand Transfer    Sit-Stand [place] (Transfers) contact guard  -RH contact guard  -RH    Assistive Device (Sit-Stand Transfers) walker, front-wheeled  -RH walker, front-wheeled  -RH      Row Name 10/17/24 1000          Sit-Stand Transfer    Sit-Stand Iron (Transfers) contact guard  -MELLISA     Assistive Device (Sit-Stand Transfers) walker, front-wheeled  -MELLISA       Row Name 10/17/24 1450 10/17/24 1200       Stand-Sit Transfer    Stand-Sit Iron (Transfers) contact guard  -RH contact guard  -RH    Assistive Device (Stand-Sit Transfers) walker, front-wheeled  -RH walker, front-wheeled  -RH      Row Name 10/17/24 1450 10/17/24 1200       Gait/Stairs (Locomotion)    Gait/Stairs Locomotion gait/ambulation assistive device  -RH gait/ambulation assistive device  -RH    [place] Level (Gait) contact guard;minimum assist (75% patient effort)  -RH minimum assist (75% patient effort)  -RH    Assistive Device (Gait) walker, front-wheeled  -RH walker, front-wheeled  -RH    Patient was able to  Ambulate yes  -RH yes  -RH    Distance in Feet (Gait) 40  -RH 10  -RH    Pattern (Gait) 3-point;step-through  -RH 3-point;step-to  -RH    Deviations/Abnormal Patterns (Gait) gait speed decreased;stride length decreased  -RH gait speed decreased;stride length decreased  -RH    Bilateral Gait Deviations heel strike decreased  -RH --    Left Sided Gait Deviations knee buckling, left side  -RH knee buckling, left side  -RH    Right Sided Gait Deviations -- heel strike decreased  -RH    Gait Assessment/Intervention Pt amb with RW and CGA/min  assist with 3 point step-through gait pattern with decreased gait speed, stride length, and bilateral heel strike with L knee still partially buckling .  Pt requires constant verbal and tactile cues for gait pattern and step pattern.  -RH Pt initially attempting to keep L foot off of the floor when bearing weight on his RLE.  -      Row Name 10/17/24 1000          Gait/Stairs (Locomotion)    Gait/Stairs Locomotion gait/ambulation assistive device  -MELLISA     [place] Level (Gait) contact guard  -MELLISA     Assistive Device (Gait) walker, front-wheeled  -MELLISA     Patient was able to Ambulate yes  -MELLISA     Distance in Feet (Gait) 70  cueing for proper gait pattern.  Pt knee jessica with WB due to significant postop weakness  -MELLISA       Row Name 10/17/24 1450 10/17/24 1200       Safety Issues/Impairments Affecting Functional Mobility    Impairments Affecting Function (Mobility) balance;pain;range of motion (ROM);strength  -RH balance;pain;range of motion (ROM);strength  -RH      Row Name 10/17/24 1000          Safety Issues/Impairments Affecting Functional Mobility    Impairments Affecting Function (Mobility) balance;pain;range of motion (ROM);strength  -MELLISA       Row Name 10/17/24 1450 10/17/24 1200       Balance    Balance Assessment standing dynamic balance  -RH standing dynamic balance  -RH    Dynamic Standing Balance contact guard;minimal assist  -RH minimal assist  -RH     Position/Device Used, Standing Balance walker, front-wheeled  -RH walker, front-wheeled  -RH      Row Name 10/17/24 1000          Balance    Balance Assessment standing dynamic balance  -MELLISA     Dynamic Standing Balance contact guard  -MELLISA     Position/Device Used, Standing Balance walker, front-wheeled  -MELLISA       Row Name 10/17/24 1450 10/17/24 1200       Motor Skills    Therapeutic Exercise hip;knee;ankle  -RH hip;knee;ankle  -RH      Row Name 10/17/24 1450 10/17/24 1200       Hip (Therapeutic Exercise)    Hip (Therapeutic Exercise) isometric exercises  - isometric exercises  -    Hip Isometrics (Therapeutic Exercise) left;gluteal sets;10 repetitions;2 sets  -RH left;gluteal sets;10 repetitions;2 sets  -RH      Row Name 10/17/24 1450 10/17/24 1200       Knee (Therapeutic Exercise)    Knee (Therapeutic Exercise) strengthening exercise;isometric exercises  -RH strengthening exercise;isometric exercises  -RH    Knee Isometrics (Therapeutic Exercise) left;quad sets;10 repetitions;2 sets  -RH left;quad sets;10 repetitions;2 sets  -RH    Knee Strengthening (Therapeutic Exercise) left;heel slides;SLR (straight leg raise);SAQ (short arc quad);LAQ (long arc quad);10 repetitions;2 sets  -RH left;heel slides;SLR (straight leg raise);SAQ (short arc quad);LAQ (long arc quad);10 repetitions;2 sets  -RH      Row Name 10/17/24 1450 10/17/24 1200       Ankle (Therapeutic Exercise)    Ankle (Therapeutic Exercise) AROM (active range of motion)  -RH AROM (active range of motion)  -    Ankle AROM (Therapeutic Exercise) left;dorsiflexion;plantarflexion;10 repetitions;2 sets  -RH left;dorsiflexion;plantarflexion;10 repetitions;2 sets  -RH      Row Name             Wound 10/16/24 1447 Left anterior knee    Wound - Properties Group Placement Date: 10/16/24  -SC Placement Time: 1447  -SC Side: Left  -SC Orientation: anterior  -SC Location: knee  -SC Primary Wound Type: Incision  -SC    Retired Wound - Properties Group Placement Date:  10/16/24  -SC Placement Time: 1447  -SC Side: Left  -SC Orientation: anterior  -SC Location: knee  -SC Primary Wound Type: Incision  -SC    Retired Wound - Properties Group Placement Date: 10/16/24  -SC Placement Time: 1447  -SC Side: Left  -SC Orientation: anterior  -SC Location: knee  -SC Primary Wound Type: Incision  -SC    Retired Wound - Properties Group Date first assessed: 10/16/24  -SC Time first assessed: 1447  -SC Side: Left  -SC Location: knee  -SC Primary Wound Type: Incision  -SC      Row Name             Wound 10/16/24 1448 Left proximal leg    Wound - Properties Group Placement Date: 10/16/24  -SC Placement Time: 1448  -SC Side: Left  -SC Orientation: proximal  -SC Location: leg  -SC Primary Wound Type: Puncture  -SC    Retired Wound - Properties Group Placement Date: 10/16/24  -SC Placement Time: 1448  -SC Side: Left  -SC Orientation: proximal  -SC Location: leg  -SC Primary Wound Type: Puncture  -SC    Retired Wound - Properties Group Placement Date: 10/16/24  -SC Placement Time: 1448  -SC Side: Left  -SC Orientation: proximal  -SC Location: leg  -SC Primary Wound Type: Puncture  -SC    Retired Wound - Properties Group Date first assessed: 10/16/24  -SC Time first assessed: 1448  -SC Side: Left  -SC Location: leg  -SC Primary Wound Type: Puncture  -SC      Row Name 10/17/24 1000          Plan of Care Review    Plan of Care Reviewed With patient  -MELLISA     Outcome Evaluation Pt presents with decreased ROM, strength, transfers and ambulation.  Skilled PT services will be required to address these mobility deficits.  -MELLISA       Row Name 10/17/24 1200          Vital Signs    O2 Delivery Intra Treatment room air  -RH       Row Name 10/17/24 1450 10/17/24 1200       Positioning and Restraints    Post Treatment Position -- chair  -RH    In Chair reclined;call light within reach;encouraged to call for assist;exit alarm on;with family/caregiver  -RH reclined;call light within reach;encouraged to call for  assist;exit alarm on  -RH      Row Name 10/17/24 1000          Therapy Assessment/Plan (PT)    Patient/Family Therapy Goals Statement (PT) Walk without pain  -MELLISA     Rehab Potential (PT) good  -MELLISA     Criteria for Skilled Interventions Met (PT) skilled treatment is necessary  -MELLISA     Therapy Frequency (PT) 2 times/day  -MELLISA     Predicted Duration of Therapy Intervention (PT) 10 days  -MELLISA     Problem List (PT) problems related to;balance;mobility;range of motion (ROM);strength;pain  -MELLISA     Activity Limitations Related to Problem List (PT) unable to ambulate safely;unable to transfer safely  -MELLISA       Row Name 10/17/24 1000          PT Evaluation Complexity    History, PT Evaluation Complexity no personal factors and/or comorbidities  -MELLISA     Examination of Body Systems (PT Eval Complexity) total of 4 or more elements  -MELLISA     Clinical Presentation (PT Evaluation Complexity) stable  -MELLISA     Clinical Decision Making (PT Evaluation Complexity) low complexity  -MELLISA     Overall Complexity (PT Evaluation Complexity) low complexity  -MELLISA       Row Name 10/17/24 1450 10/17/24 1200       Progress Summary (PT)    Progress Toward Functional Goals (PT) progress toward functional goals is good  -RH progress toward functional goals is good  -RH    Daily Progress Summary (PT) Pt is progressing well with their exercise program.  Will continue current plan of care.  -RH Pt is progressing well with their exercise program.  Will continue current plan of care.  -      Row Name 10/17/24 1000          Therapy Plan Review/Discharge Plan (PT)    Therapy Plan Review (PT) evaluation/treatment results reviewed;participants voiced agreement with care plan;participants included;patient  -MELLISA       Row Name 10/17/24 1000          Physical Therapy Goals    Transfer Goal Selection (PT) transfer, PT goal 1  -MELLISA     Gait Training Goal Selection (PT) gait training, PT goal 1  -MELLISA     ROM Goal Selection (PT) ROM, PT goal 1  -MELLISA     Strength Goal  Selection (PT) strength, PT goal 1  -MELLISA       Row Name 10/17/24 1000          Transfer Goal 1 (PT)    Activity/Assistive Device (Transfer Goal 1, PT) transfers, all  -MELLISA     [place] Level/Cues Needed (Transfer Goal 1, PT) independent  -MELLISA     Time Frame (Transfer Goal 1, PT) long term goal (LTG);10 days  -MELLISA       Row Name 10/17/24 1000          Gait Training Goal 1 (PT)    Activity/Assistive Device (Gait Training Goal 1, PT) gait (walking locomotion);walker, rolling  -MELLISA     [place] Level (Gait Training Goal 1, PT) independent  -MELLISA     Distance (Gait Training Goal 1, PT) 300  -MELLISA     Time Frame (Gait Training Goal 1, PT) long term goal (LTG);10 days  -MELLISA       Row Name 10/17/24 1000          ROM Goal 1 (PT)    ROM Goal 1 (PT) Pt will demonstrate knee ROM from 0-110° on the affected side.  -MELLISA     Time Frame (ROM Goal 1, PT) long-term goal (LTG);10 days  -MELLISA       Row Name 10/17/24 1000          Strength Goal 1 (PT)    Strength Goal 1 (PT) Pt will demonstrate knee extension strength of 5/5 on the affected side.  -MELLISA     Time Frame (Strength Goal 1, PT) long term goal (LTG);10 days  -MELLISA               User Key  (r) = Recorded By, (t) = Taken By, (c) = Cosigned By      Initials Name Provider Type    Lisa Malloy, RN Registered Nurse    Bebo Mabry PTA Physical Therapist Assistant    Vish Tolentino, PT Physical Therapist                    Physical Therapy Education       Title: PT OT SLP Therapies (Done)       Topic: Physical Therapy (Done)       Point: Mobility training (Done)       Learning Progress Summary            Patient Acceptance, E, VU by AV at 10/17/2024 1134    Acceptance, E,TB, VU by MELLISA at 10/17/2024 1010                      Point: Precautions (Done)       Learning Progress Summary            Patient Acceptance, E, VU by AV at 10/17/2024 1134    Acceptance, E,TB, VU by MELLISA at 10/17/2024 1010                                      User Key       Initials Effective Dates Name  Provider Type Discipline    AV 06/16/21 -  Zay Sher OT Occupational Therapist OT    MELLISA 06/03/21 -  Vish Castro, PT Physical Therapist PT                  PT Recommendation and Plan     Progress Summary (PT)  Progress Toward Functional Goals (PT): progress toward functional goals is good  Daily Progress Summary (PT): Pt is progressing well with their exercise program.  Will continue current plan of care.   Outcome Measures       Row Name 10/17/24 1200 10/17/24 1000          How much help from another person do you currently need...    Turning from your back to your side while in flat bed without using bedrails? 3  -RH 3  -MELLISA     Moving from lying on back to sitting on the side of a flat bed without bedrails? 3  -RH 3  -MELLISA     Moving to and from a bed to a chair (including a wheelchair)? 3  -RH 3  -MELLISA     Standing up from a chair using your arms (e.g., wheelchair, bedside chair)? 3  -RH 3  -MELLISA     Climbing 3-5 steps with a railing? 2  -RH 2  -MELLISA     To walk in hospital room? 2  -RH 2  -MELLISA     AM-PAC 6 Clicks Score (PT) 16  -RH 16  -MELLISA        Functional Assessment    Outcome Measure Options -- AM-PAC 6 Clicks Basic Mobility (PT)  -MELLISA               User Key  (r) = Recorded By, (t) = Taken By, (c) = Cosigned By      Initials Name Provider Type    RH Bebo Shen, PTA Physical Therapist Assistant    Vish Tolentino, PT Physical Therapist                     Time Calculation:    PT Charges       Row Name 10/17/24 1450 10/17/24 1248 10/17/24 1008       Time Calculation    PT Received On 10/17/24  -RH 10/17/24  -RH 10/17/24  -MELLISA    PT Goal Re-Cert Due Date -- -- 10/26/24  -MELLISA       Timed Charges    12488 - Gait Training Minutes  7  -RH 8  -RH --    56682 - PT Therapeutic Activity Minutes 5  -RH 5  -RH --       Untimed Charges    PT Eval/Re-eval Minutes -- -- 30  -MELLISA    PT Group Therapy Minutes 20  -RH 30  -RH --       Total Minutes    Timed Charges Total Minutes 12  -RH 13  -RH --    Untimed Charges Total  Minutes 20  -RH 30  -RH 30  -MELLISA     Total Minutes 32  -RH 43  -RH 30  -MELLISA              User Key  (r) = Recorded By, (t) = Taken By, (c) = Cosigned By      Initials Name Provider Type    Bebo Mabry PTA Physical Therapist Assistant    Vish Tolentino, PT Physical Therapist                  Therapy Charges for Today       Code Description Service Date Service Provider Modifiers Qty    98351087790 HC GAIT TRAINING EA 15 MIN 10/17/2024 Bebo Shen, RITO GP 1    96554302342 HC PT THER PROC GROUP 10/17/2024 Bebo Shen, PTA GP 1    07554681773 HC GAIT TRAINING EA 15 MIN 10/17/2024 Bebo Shen, RITO GP 1    05436844776 HC PT THER PROC GROUP 10/17/2024 Bebo Shen, RITO GP 1            PT G-Codes  Outcome Measure Options: AM-PAC 6 Clicks Daily Activity (OT), Optimal Instrument  AM-PAC 6 Clicks Score (PT): 16  AM-PAC 6 Clicks Score (OT): 21    Bebo Shen PTA  10/17/2024

## 2024-10-17 NOTE — PROGRESS NOTES
[facility]     Progress Note    Patient Name: Patricia Whyte  : 1960  MRN: [id number]  Primary Care Physician:  Puja Santiago APRN  Date of admission: 10/16/2024    Subjective   Subjective     HPI:  Patient Reports no new complaints.  Still having some effects of the nerve block on the leg with feeling weak when she gets up to walk.  She denies any chest pain or shortness of air.    Review of Systems   See HPI    Objective   Objective     Vitals:   Temp:  [96.9 °F (36.1 °C)-98.8 °F (37.1 °C)] 98.2 °F (36.8 °C)  Heart Rate:  [] 87  Resp:  [12-20] 16  BP: ()/(46-79) 108/68  Flow (L/min) (Oxygen Therapy):  [2-6] 2  FiO2 (%):  [56 %] 56 %  Physical Exam    General: Alert, no acute distress   Chest: Unlabored breathing, cardiovascular: Regular heart rate   Musculoskeletal: Neurovascular intact extremity.  Positive pulses.  Dressing intact.  Full extension.    Result Review      WBC   Date Value Ref Range Status   10/17/2024 15.51 (H) 3.40 - 10.80 10*3/mm3 Final     RBC   Date Value Ref Range Status   10/17/2024 4.11 3.77 - 5.28 10*6/mm3 Final     Hemoglobin   Date Value Ref Range Status   10/17/2024 13.1 12.0 - 15.9 g/dL Final     Hematocrit   Date Value Ref Range Status   10/17/2024 39.8 34.0 - 46.6 % Final     MCV   Date Value Ref Range Status   10/17/2024 96.8 79.0 - 97.0 fL Final     MCH   Date Value Ref Range Status   10/17/2024 31.9 26.6 - 33.0 pg Final     MCHC   Date Value Ref Range Status   10/17/2024 32.9 31.5 - 35.7 g/dL Final     RDW   Date Value Ref Range Status   10/17/2024 12.5 12.3 - 15.4 % Final     RDW-SD   Date Value Ref Range Status   10/17/2024 44.8 37.0 - 54.0 fl Final     MPV   Date Value Ref Range Status   10/17/2024 10.7 6.0 - 12.0 fL Final     Platelets   Date Value Ref Range Status   10/17/2024 204 140 - 450 10*3/mm3 Final        Result Review:  I have personally reviewed the results from the time of this admission to 10/17/2024 07:45 EDT and agree with  these findings:  [x]  Laboratory  []  Microbiology  [x]  Radiology  []  EKG/Telemetry   []  Cardiology/Vascular   []  Pathology  []  Old records  []  Other:      Assessment & Plan   Assessment / Plan     Brief Patient Summary:  Patricia Whyte is a 64 y.o. female who is status post left total knee arthroplasty    Active Hospital Problems:  Active Hospital Problems    Diagnosis     **Primary osteoarthritis of left knee     Osteoarthritis of left knee, unspecified osteoarthritis type      Plan: Weightbearing as tolerated with walker  Physical and Occupational Therapy  Pain control  DVT prophylaxis  Discharge planning: Home later today if able per therapy.  Otherwise can discharge tomorrow       VTE Prophylaxis:  Pharmacologic & mechanical VTE prophylaxis orders are present.        CODE STATUS:   Code Status (Patient has no pulse and is not breathing): CPR (Attempt to Resuscitate)  Medical Interventions (Patient has pulse or is breathing): Full Support    Disposition:  I expect patient to be discharged when okay per therapy.    Electronically signed by Jhonathan Morales MD, 10/17/24, 7:45 AM EDT.

## 2024-10-17 NOTE — PLAN OF CARE
Goal Outcome Evaluation:  Plan of Care Reviewed With: patient, daughter        Progress: improving (Through ADL retraining, patient is now able to perform basic ADLs SBA with set up and cues for safe positioning and adaptive techniques.)  Outcome Evaluation: Patient presents with limitations of balance and endurance/activity tolerance which impede her ability to perform ADL and transfers as prior.  The skills of a therapist will be required to safely and effectively implement treatment plan to restore maximal level of function.    Anticipated Discharge Disposition (OT): home with assist, home with outpatient therapy services

## 2024-10-17 NOTE — PROGRESS NOTES
MD ordered home use cpap/bipap however when this RT went to talk to patient, she states she does not wear NIV.  She states she was diagnosed 20 years ago and has never worn a machine.  Patient does not want one of our machines and advised patient to call if she changed her mind.

## 2024-10-17 NOTE — THERAPY TREATMENT NOTE
Acute Care - Physical Therapy Treatment Note   [place]     Patient Name: Patricia Whyte  : 1960  MRN: [id number]  Today's Date: 10/17/2024      Visit Dx:     ICD-10-CM ICD-9-CM   1. Difficulty in walking  R26.2 719.7   2. Primary osteoarthritis of left knee  M17.12 715.16   3. Decreased activities of daily living (ADL)  Z78.9 V49.89     Patient Active Problem List   Diagnosis    Primary osteoarthritis of left knee    Osteoarthritis of left knee, unspecified osteoarthritis type     Past Medical History:   Diagnosis Date    Cancer     cervical cancer-treated with chemo and radiation    GERD (gastroesophageal reflux disease)     History of fusion of cervical spine     Hyperlipidemia     IBS (irritable bowel syndrome)     Osteoarthritis     Seasonal allergies     Sleep apnea      Past Surgical History:   Procedure Laterality Date    ANTERIOR CERVICAL FUSION       SECTION      CHOLECYSTECTOMY      COLON RESECTION      HYSTERECTOMY      POSTERIOR CERVICAL FUSION      TOTAL KNEE ARTHROPLASTY Left 10/16/2024    Procedure: LEFT TOTAL KNEE ARTHROPLASTY WITH DIALLO ROBOT;  Surgeon: Jhonathan Morales MD;  Location: [facility] MAIN OR;  Service: Robotics - Ortho;  Laterality: Left;     PT Assessment (Last 12 Hours)       PT Evaluation and Treatment       Row Name 10/17/24 1200 10/17/24 1000       Physical Therapy Time and Intention    Subjective Information complains of;weakness  -RH complains of;pain  -MELLISA    Document Type therapy note (daily note)  -RH evaluation  -MELLISA    Mode of Treatment physical therapy;group therapy;individual therapy  - individual therapy;physical therapy  -MELLISA    Patient Effort good  -RH good  -MELLISA    Symptoms Noted During/After Treatment -- none  -MELLISA    Comment Gait performed individually; therapuetic exercises performed in a group session with 5 participants  - --      Row Name 10/17/24 1200 10/17/24 1000       General Information    Patient Observations alert;cooperative;agree to  therapy  - alert;cooperative;agree to therapy  -MELLISA    Prior Level of Function -- independent:;all household mobility;community mobility  -MELLISA    Equipment Currently Used at Home -- none  -MELLISA    Existing Precautions/Restrictions -- fall;weight bearing  -MELLISA    Barriers to Rehab -- none identified  -MELLISA      Row Name 10/17/24 1000          Living Environment    Current Living Arrangements home  -MELLISA     People in Home spouse  -MELLISA       Row Name 10/17/24 1000          Cognition    Orientation Status (Cognition) oriented x 3  -MELLISA       Row Name 10/17/24 1000          Range of Motion Comprehensive    General Range of Motion lower extremity range of motion deficits identified  L knee 0-100°  -MELLISA       Row Name 10/17/24 1000          Strength Comprehensive (MMT)    General Manual Muscle Testing (MMT) Assessment lower extremity strength deficits identified  L knee extension 1/5  -       Row Name 10/17/24 1200 10/17/24 1000       Mobility    Extremity Weight-bearing Status left lower extremity  - left lower extremity  -MELLISA    Left Lower Extremity (Weight-bearing Status) weight-bearing as tolerated (WBAT)  -RH weight-bearing as tolerated (WBAT)  -MELLISA      Row Name 10/17/24 1000          Bed Mobility    Bed Mobility bed mobility (all) activities;supine-sit  -MELLISA     All Activities, [place] (Bed Mobility) standby assist  -MELLISA     Supine-Sit [place] (Bed Mobility) standby assist  -MELLISA       Row Name 10/17/24 1200 10/17/24 1000       Transfers    Transfers sit-stand transfer;stand-sit transfer  - bed-chair transfer;sit-stand transfer  -MELLISA      Row Name 10/17/24 1000          Bed-Chair Transfer    Bed-Chair [place] (Transfers) contact guard  -MELLISA     Assistive Device (Bed-Chair Transfers) walker, front-wheeled  -MELLISA       Row Name 10/17/24 1200 10/17/24 1000       Sit-Stand Transfer    Sit-Stand [place] (Transfers) contact guard  - contact guard  -MELLISA    Assistive Device (Sit-Stand Transfers) walker,  front-wheeled  -RH walker, front-wheeled  -MELLISA      Row Name 10/17/24 1200          Stand-Sit Transfer    Stand-Sit [place] (Transfers) contact guard  -RH     Assistive Device (Stand-Sit Transfers) walker, front-wheeled  -RH       Row Name 10/17/24 1200 10/17/24 1000       Gait/Stairs (Locomotion)    Gait/Stairs Locomotion gait/ambulation assistive device  -RH gait/ambulation assistive device  -MELLISA    [place] Level (Gait) minimum assist (75% patient effort)  -RH contact guard  -MELLISA    Assistive Device (Gait) walker, front-wheeled  -RH walker, front-wheeled  -MELLISA    Patient was able to Ambulate yes  -RH yes  -MELLISA    Distance in Feet (Gait) 10  -RH 70  cueing for proper gait pattern.  Pt knee jessica with WB due to significant postop weakness  -MELLISA    Pattern (Gait) 3-point;step-to  -RH --    Deviations/Abnormal Patterns (Gait) gait speed decreased;stride length decreased  -RH --    Left Sided Gait Deviations knee buckling, left side  -RH --    Right Sided Gait Deviations heel strike decreased  -RH --    Gait Assessment/Intervention Pt initially attempting to keep L foot off of the floor when bearing weight on his RLE.  -RH --      Row Name 10/17/24 1200 10/17/24 1000       Safety Issues/Impairments Affecting Functional Mobility    Impairments Affecting Function (Mobility) balance;pain;range of motion (ROM);strength  -RH balance;pain;range of motion (ROM);strength  -MELLISA      Row Name 10/17/24 1200 10/17/24 1000       Balance    Balance Assessment standing dynamic balance  -RH standing dynamic balance  -MELLISA    Dynamic Standing Balance minimal assist  -RH contact guard  -MELLISA    Position/Device Used, Standing Balance walker, front-wheeled  -RH walker, front-wheeled  -MELLISA      Row Name 10/17/24 1200          Motor Skills    Therapeutic Exercise hip;knee;ankle  -RH       Row Name 10/17/24 1200          Hip (Therapeutic Exercise)    Hip (Therapeutic Exercise) isometric exercises  -RH     Hip Isometrics (Therapeutic  Exercise) left;gluteal sets;10 repetitions;2 sets  -       Row Name 10/17/24 1200          Knee (Therapeutic Exercise)    Knee (Therapeutic Exercise) strengthening exercise;isometric exercises  -     Knee Isometrics (Therapeutic Exercise) left;quad sets;10 repetitions;2 sets  -     Knee Strengthening (Therapeutic Exercise) left;heel slides;SLR (straight leg raise);SAQ (short arc quad);LAQ (long arc quad);10 repetitions;2 sets  -       Row Name 10/17/24 1200          Ankle (Therapeutic Exercise)    Ankle (Therapeutic Exercise) AROM (active range of motion)  -     Ankle AROM (Therapeutic Exercise) left;dorsiflexion;plantarflexion;10 repetitions;2 sets  -       Row Name             Wound 10/16/24 1447 Left anterior knee    Wound - Properties Group Placement Date: 10/16/24  -SC Placement Time: 1447  -SC Side: Left  -SC Orientation: anterior  -SC Location: knee  -SC Primary Wound Type: Incision  -SC    Retired Wound - Properties Group Placement Date: 10/16/24  -SC Placement Time: 1447  -SC Side: Left  -SC Orientation: anterior  -SC Location: knee  -SC Primary Wound Type: Incision  -SC    Retired Wound - Properties Group Placement Date: 10/16/24  -SC Placement Time: 1447  -SC Side: Left  -SC Orientation: anterior  -SC Location: knee  -SC Primary Wound Type: Incision  -SC    Retired Wound - Properties Group Date first assessed: 10/16/24  -SC Time first assessed: 1447  -SC Side: Left  -SC Location: knee  -SC Primary Wound Type: Incision  -SC      Row Name             Wound 10/16/24 1448 Left proximal leg    Wound - Properties Group Placement Date: 10/16/24  -SC Placement Time: 1448  -SC Side: Left  -SC Orientation: proximal  -SC Location: leg  -SC Primary Wound Type: Puncture  -SC    Retired Wound - Properties Group Placement Date: 10/16/24  -SC Placement Time: 1448  -SC Side: Left  -SC Orientation: proximal  -SC Location: leg  -SC Primary Wound Type: Puncture  -SC    Retired Wound - Properties Group Placement  Date: 10/16/24  -SC Placement Time: 1448 -SC Side: Left  -SC Orientation: proximal  -SC Location: leg  -SC Primary Wound Type: Puncture  -SC    Retired Wound - Properties Group Date first assessed: 10/16/24  -SC Time first assessed: 1448  -SC Side: Left  -SC Location: leg  -SC Primary Wound Type: Puncture  -SC      Row Name 10/17/24 1000          Plan of Care Review    Plan of Care Reviewed With patient  -MELLISA     Outcome Evaluation Pt presents with decreased ROM, strength, transfers and ambulation.  Skilled PT services will be required to address these mobility deficits.  -MELLISA       Row Name 10/17/24 1200          Vital Signs    O2 Delivery Intra Treatment room air  -RH       Row Name 10/17/24 1200          Positioning and Restraints    Post Treatment Position chair  -RH     In Chair reclined;call light within reach;encouraged to call for assist;exit alarm on  -RH       Row Name 10/17/24 1000          Therapy Assessment/Plan (PT)    Patient/Family Therapy Goals Statement (PT) Walk without pain  -MELLISA     Rehab Potential (PT) good  -MELLISA     Criteria for Skilled Interventions Met (PT) skilled treatment is necessary  -MELLISA     Therapy Frequency (PT) 2 times/day  -MELLISA     Predicted Duration of Therapy Intervention (PT) 10 days  -MELLISA     Problem List (PT) problems related to;balance;mobility;range of motion (ROM);strength;pain  -MELLISA     Activity Limitations Related to Problem List (PT) unable to ambulate safely;unable to transfer safely  -MELLISA       Row Name 10/17/24 1000          PT Evaluation Complexity    History, PT Evaluation Complexity no personal factors and/or comorbidities  -MELLISA     Examination of Body Systems (PT Eval Complexity) total of 4 or more elements  -MELLISA     Clinical Presentation (PT Evaluation Complexity) stable  -MELLISA     Clinical Decision Making (PT Evaluation Complexity) low complexity  -MELLISA     Overall Complexity (PT Evaluation Complexity) low complexity  -MELLISA       Row Name 10/17/24 1200          Progress Summary  (PT)    Progress Toward Functional Goals (PT) progress toward functional goals is good  -RH     Daily Progress Summary (PT) Pt is progressing well with their exercise program.  Will continue current plan of care.  -       Row Name 10/17/24 1000          Therapy Plan Review/Discharge Plan (PT)    Therapy Plan Review (PT) evaluation/treatment results reviewed;participants voiced agreement with care plan;participants included;patient  -MELLISA       Row Name 10/17/24 1000          Physical Therapy Goals    Transfer Goal Selection (PT) transfer, PT goal 1  -MELLISA     Gait Training Goal Selection (PT) gait training, PT goal 1  -MELLISA     ROM Goal Selection (PT) ROM, PT goal 1  -MELLISA     Strength Goal Selection (PT) strength, PT goal 1  -MELLISA       Row Name 10/17/24 1000          Transfer Goal 1 (PT)    Activity/Assistive Device (Transfer Goal 1, PT) transfers, all  -MELLISA     [place] Level/Cues Needed (Transfer Goal 1, PT) independent  -MELLISA     Time Frame (Transfer Goal 1, PT) long term goal (LTG);10 days  -MELLISA       Row Name 10/17/24 1000          Gait Training Goal 1 (PT)    Activity/Assistive Device (Gait Training Goal 1, PT) gait (walking locomotion);walker, rolling  -MELLISA     [place] Level (Gait Training Goal 1, PT) independent  -MELLISA     Distance (Gait Training Goal 1, PT) 300  -MELLISA     Time Frame (Gait Training Goal 1, PT) long term goal (LTG);10 days  -MELLISA       Row Name 10/17/24 1000          ROM Goal 1 (PT)    ROM Goal 1 (PT) Pt will demonstrate knee ROM from 0-110° on the affected side.  -MELLISA     Time Frame (ROM Goal 1, PT) long-term goal (LTG);10 days  -MELLISA       Row Name 10/17/24 1000          Strength Goal 1 (PT)    Strength Goal 1 (PT) Pt will demonstrate knee extension strength of 5/5 on the affected side.  -MELLISA     Time Frame (Strength Goal 1, PT) long term goal (LTG);10 days  -MELLISA               User Key  (r) = Recorded By, (t) = Taken By, (c) = Cosigned By      Initials Name Provider Type    Lisa Malloy RN  Registered Nurse    Bebo Mabry PTA Physical Therapist Assistant    Vish Tolentino, PT Physical Therapist                    Physical Therapy Education       Title: PT OT SLP Therapies (Done)       Topic: Physical Therapy (Done)       Point: Mobility training (Done)       Learning Progress Summary            Patient Acceptance, E, VU by AV at 10/17/2024 1134    Acceptance, E,TB, VU by MELLISA at 10/17/2024 1010                      Point: Precautions (Done)       Learning Progress Summary            Patient Acceptance, E, VU by AV at 10/17/2024 1134    Acceptance, E,TB, VU by MELLISA at 10/17/2024 1010                                      User Key       Initials Effective Dates Name Provider Type Discipline    TRESA 06/16/21 -  Zay Sher OT Occupational Therapist OT    MELLISA 06/03/21 -  Vish Castro, VICENTA Physical Therapist PT                  PT Recommendation and Plan     Progress Summary (PT)  Progress Toward Functional Goals (PT): progress toward functional goals is good  Daily Progress Summary (PT): Pt is progressing well with their exercise program.  Will continue current plan of care.   Outcome Measures       Row Name 10/17/24 1200 10/17/24 1000          How much help from another person do you currently need...    Turning from your back to your side while in flat bed without using bedrails? 3  -RH 3  -MELLISA     Moving from lying on back to sitting on the side of a flat bed without bedrails? 3  -RH 3  -MELLISA     Moving to and from a bed to a chair (including a wheelchair)? 3  -RH 3  -MELLISA     Standing up from a chair using your arms (e.g., wheelchair, bedside chair)? 3  -RH 3  -MELLISA     Climbing 3-5 steps with a railing? 2  -RH 2  -MELLISA     To walk in hospital room? 2  -RH 2  -MELLISA     AM-PAC 6 Clicks Score (PT) 16  -RH 16  -MELLISA        Functional Assessment    Outcome Measure Options -- AM-PAC 6 Clicks Basic Mobility (PT)  -MELLISA               User Key  (r) = Recorded By, (t) = Taken By, (c) = Cosigned By      Initials Name  Provider Type     Bebo Shen PTA Physical Therapist Assistant    Vish Tolentino, PT Physical Therapist                     Time Calculation:    PT Charges       Row Name 10/17/24 1248 10/17/24 1008          Time Calculation    PT Received On 10/17/24  -RH 10/17/24  -MELLISA     PT Goal Re-Cert Due Date -- 10/26/24  -MELLISA        Timed Charges    02629 - Gait Training Minutes  8  -RH --     53311 - PT Therapeutic Activity Minutes 5  -RH --        Untimed Charges    PT Eval/Re-eval Minutes -- 30  -MELLISA     PT Group Therapy Minutes 30  -RH --        Total Minutes    Timed Charges Total Minutes 13  -RH --     Untimed Charges Total Minutes 30  -RH 30  -MELLISA      Total Minutes 43  -RH 30  -MELLISA               User Key  (r) = Recorded By, (t) = Taken By, (c) = Cosigned By      Initials Name Provider Type     Bebo Shen PTA Physical Therapist Assistant    Vish Tolentino, PT Physical Therapist                  Therapy Charges for Today       Code Description Service Date Service Provider Modifiers Qty    99888059614 HC GAIT TRAINING EA 15 MIN 10/17/2024 Bebo Shen PTA GP 1    07083614368 HC PT THER PROC GROUP 10/17/2024 Bebo Shen PTA GP 1            PT G-Codes  Outcome Measure Options: AM-PAC 6 Clicks Daily Activity (OT), Optimal Instrument  AM-PAC 6 Clicks Score (PT): 16  AM-PAC 6 Clicks Score (OT): 21    Bebo Shen PTA  10/17/2024

## 2024-10-17 NOTE — PLAN OF CARE
Goal Outcome Evaluation:  Plan of Care Reviewed With: patient           Outcome Evaluation: Pt is A&O X 4, room air, and X 2 assist. All scheduled medications given as ordered and pain managed with PRN medications. VSS. Safety checks maintained throughout shift with pt resting in chair, wheels to chair locked, and personal items and call light within reach.

## 2024-10-18 VITALS
SYSTOLIC BLOOD PRESSURE: 116 MMHG | TEMPERATURE: 97.3 F | WEIGHT: 179.9 LBS | HEIGHT: 63 IN | RESPIRATION RATE: 16 BRPM | DIASTOLIC BLOOD PRESSURE: 61 MMHG | BODY MASS INDEX: 31.88 KG/M2 | HEART RATE: 80 BPM | OXYGEN SATURATION: 94 %

## 2024-10-18 LAB
ANION GAP SERPL CALCULATED.3IONS-SCNC: 8.1 MMOL/L (ref 5–15)
BUN SERPL-MCNC: 13 MG/DL (ref 8–23)
BUN/CREAT SERPL: 15.3 (ref 7–25)
CALCIUM SPEC-SCNC: 8 MG/DL (ref 8.6–10.5)
CHLORIDE SERPL-SCNC: 106 MMOL/L (ref 98–107)
CO2 SERPL-SCNC: 24.9 MMOL/L (ref 22–29)
CREAT SERPL-MCNC: 0.85 MG/DL (ref 0.57–1)
EGFRCR SERPLBLD CKD-EPI 2021: 77.6 ML/MIN/1.73
GLUCOSE SERPL-MCNC: 106 MG/DL (ref 65–99)
POTASSIUM SERPL-SCNC: 4.4 MMOL/L (ref 3.5–5.2)
SODIUM SERPL-SCNC: 139 MMOL/L (ref 136–145)
WHOLE BLOOD HOLD SPECIMEN: NORMAL

## 2024-10-18 PROCEDURE — 97116 GAIT TRAINING THERAPY: CPT

## 2024-10-18 PROCEDURE — 97535 SELF CARE MNGMENT TRAINING: CPT

## 2024-10-18 PROCEDURE — 99213 OFFICE O/P EST LOW 20 MIN: CPT | Performed by: PHYSICIAN ASSISTANT

## 2024-10-18 PROCEDURE — 80048 BASIC METABOLIC PNL TOTAL CA: CPT | Performed by: PHYSICIAN ASSISTANT

## 2024-10-18 PROCEDURE — 97150 GROUP THERAPEUTIC PROCEDURES: CPT

## 2024-10-18 RX ADMIN — HYDROCODONE BITARTRATE AND ACETAMINOPHEN 2 TABLET: 7.5; 325 TABLET ORAL at 08:13

## 2024-10-18 RX ADMIN — PANTOPRAZOLE SODIUM 40 MG: 40 TABLET, DELAYED RELEASE ORAL at 08:12

## 2024-10-18 RX ADMIN — NICOTINE 1 PATCH: 14 PATCH, EXTENDED RELEASE TRANSDERMAL at 08:13

## 2024-10-18 RX ADMIN — APIXABAN 2.5 MG: 2.5 TABLET, FILM COATED ORAL at 08:12

## 2024-10-18 NOTE — PLAN OF CARE
Goal Outcome Evaluation:  Plan of Care Reviewed With: patient        Progress: improving  Outcome Evaluation: Pt is A&Ox4, VSS. Pt ambulated approx 100 feet this shift. Pt c/o pain x 1, pain managed with prn pain medication. Pt plans to discharge home with family today.

## 2024-10-18 NOTE — THERAPY TREATMENT NOTE
Patient Name: Patricia Whyte  : 1960    MRN: [id number]                              Today's Date: 10/18/2024       Admit Date: 10/16/2024    Visit Dx:     ICD-10-CM ICD-9-CM   1. Difficulty in walking  R26.2 719.7   2. Primary osteoarthritis of left knee  M17.12 715.16   3. Decreased activities of daily living (ADL)  Z78.9 V49.89     Patient Active Problem List   Diagnosis    Primary osteoarthritis of left knee    Osteoarthritis of left knee, unspecified osteoarthritis type     Past Medical History:   Diagnosis Date    Cancer     cervical cancer-treated with chemo and radiation    GERD (gastroesophageal reflux disease)     History of fusion of cervical spine     Hyperlipidemia     IBS (irritable bowel syndrome)     Osteoarthritis     Seasonal allergies     Sleep apnea      Past Surgical History:   Procedure Laterality Date    ANTERIOR CERVICAL FUSION       SECTION      CHOLECYSTECTOMY      COLON RESECTION      HYSTERECTOMY      POSTERIOR CERVICAL FUSION      TOTAL KNEE ARTHROPLASTY Left 10/16/2024    Procedure: LEFT TOTAL KNEE ARTHROPLASTY WITH DIALLO ROBOT;  Surgeon: Jhonathan Morales MD;  Location: [facility];  Service: Robotics - Ortho;  Laterality: Left;      General Information       Row Name 10/18/24 1153          OT Time and Intention    Document Type therapy note (daily note)  -AV     Mode of Treatment individual therapy;occupational therapy  -AV     Patient Effort excellent  -AV       Row Name 10/18/24 1153          General Information    Existing Precautions/Restrictions fall  WBAT  -AV       Row Name 10/18/24 1153          Cognition    Orientation Status (Cognition) --  Alert, pleasant and motivated.  Receptive to cues for safe positioning and adaptive techniques throughout functional ADL session  -AV       Row Name 10/18/24 1153          Safety Issues/Impairments Affecting Functional Mobility    Impairments Affecting Function (Mobility) balance;endurance/activity tolerance  -AV                User Key  (r) = Recorded By, (t) = Taken By, (c) = Cosigned By      Initials Name Provider Type    Zay Hanson OT Occupational Therapist                     Mobility/ADL's       Row Name 10/18/24 1154          Transfers    Transfers sit-stand transfer  -AV     Comment, (Transfers) X 4 during functional ADL session  -AV       Row Name 10/18/24 1154          Bed-Chair Transfer    Bed-Chair Sargeant (Transfers) standby assist;verbal cues  -AV     Assistive Device (Bed-Chair Transfers) walker, front-wheeled  -AV       Row Name 10/18/24 1154          Activities of Daily Living    BADL Assessment/Intervention --  Independent upper body bathing/dressing with set up while seated.  SBA lower body bathing/dressing.  Patient able to stand for [employer] and pulling pants up/down hips this session without knee buckling.  Independent don/doff socks and slip on shoes.  -AV               User Key  (r) = Recorded By, (t) = Taken By, (c) = Cosigned By      Initials Name Provider Type    Zay Hanson OT Occupational Therapist                   Obj/Interventions       Row Name 10/18/24 1155          Balance    Balance Assessment sitting dynamic balance;sit to stand dynamic balance;standing dynamic balance  -AV     Dynamic Sitting Balance independent  -AV     Sit to Stand Dynamic Balance standby assist;verbal cues  -AV     Dynamic Standing Balance standby assist  -AV     Position/Device Used, Standing Balance walker, rolling  -AV     Balance Interventions standing;sit to stand;supported;minimal challenge;occupation based/functional task  -AV               User Key  (r) = Recorded By, (t) = Taken By, (c) = Cosigned By      Initials Name Provider Type    Zay Hanson OT Occupational Therapist                   Goals/Plan    No documentation.                  Clinical Impression       Row Name 10/18/24 1155          Pain Scale: FACES Pre/Post-Treatment    Pain: FACES Scale, Pretreatment 2-->hurts little  bit  -AV     Posttreatment Pain Rating 2-->hurts little bit  -AV       Row Name 10/18/24 1155          Plan of Care Review    Progress improving  -AV     Outcome Evaluation Patient now able to perform basic ADL and transfers SBA/RW with cues for safe positioning and adaptive techniques.  Continued OT indicated to remediate/compensate for deficits to maximize independence.  -AV       Row Name 10/18/24 1155          Vital Signs    O2 Delivery Pre Treatment room air  -AV     O2 Delivery Intra Treatment room air  -AV     O2 Delivery Post Treatment room air  -AV       Row Name 10/18/24 1155          Positioning and Restraints    Pre-Treatment Position sitting in chair/recliner  -AV     Post Treatment Position chair  -AV     In Chair reclined;call light within reach;encouraged to call for assist;exit alarm on  -AV               User Key  (r) = Recorded By, (t) = Taken By, (c) = Cosigned By      Initials Name Provider Type    Zay Hanson, SANDRA Occupational Therapist                   Outcome Measures       Row Name 10/18/24 1156          How much help from another is currently needed...    Putting on and taking off regular lower body clothing? 3  -AV     Bathing (including washing, rinsing, and drying) 3  -AV     Toileting (which includes using toilet bed pan or urinal) 3  -AV     Putting on and taking off regular upper body clothing 4  -AV     Taking care of personal grooming (such as brushing teeth) 4  -AV     Eating meals 4  -AV     AM-PAC 6 Clicks Score (OT) 21  -AV       Row Name 10/18/24 9129          How much help from another person do you currently need...    Turning from your back to your side while in flat bed without using bedrails? 3  -JW     Moving from lying on back to sitting on the side of a flat bed without bedrails? 3  -JW     Moving to and from a bed to a chair (including a wheelchair)? 3  -JW     Standing up from a chair using your arms (e.g., wheelchair, bedside chair)? 3  -JW     Climbing 3-5  steps with a railing? 3  -JW     To walk in hospital room? 3  -JW     AM-PAC 6 Clicks Score (PT) 18  -     Highest Level of Mobility Goal 6 --> Walk 10 steps or more  -       Row Name 10/18/24 1156          Optimal Instrument    Bending/Stooping 1  -AV     Standing 4  -AV     Reaching 1  -AV               User Key  (r) = Recorded By, (t) = Taken By, (c) = Cosigned By      Initials Name Provider Type    Claudia Coughlin RN Registered Nurse    Zay Hanson OT Occupational Therapist                    Occupational Therapy Education       Title: PT OT SLP Therapies (Resolved)       Topic: Occupational Therapy (Resolved)       Point: ADL training (Resolved)       Description:   Instruct learner(s) on proper safety adaptation and remediation techniques during self care or transfers.   Instruct in proper use of assistive devices.                  Learning Progress Summary            Patient Acceptance, E, VU by JOAQUIN at 10/18/2024 1030    Acceptance, E, VU by JOAQUIN at 10/18/2024 1028    Acceptance, E,TB, VU by WILNER at 10/18/2024 0536    Acceptance, E, VU by TRSEA at 10/17/2024 1134                      Point: Home exercise program (Resolved)       Description:   Instruct learner(s) on appropriate technique for monitoring, assisting and/or progressing therapeutic exercises/activities.                  Learning Progress Summary            Patient Acceptance, E, VU by JOAQUIN at 10/18/2024 1030    Acceptance, E, VU by JOAQUIN at 10/18/2024 1028    Acceptance, E,TB, VU by WILNER at 10/18/2024 0536    Acceptance, E, VU by TRESA at 10/17/2024 1134                      Point: Precautions (Resolved)       Description:   Instruct learner(s) on prescribed precautions during self-care and functional transfers.                  Learning Progress Summary            Patient Acceptance, E, VU by JOAQUIN at 10/18/2024 1030    Acceptance, E, VU by JOAQUIN at 10/18/2024 1028    Acceptance, E,TB, VU by WILNER at 10/18/2024 0536    Acceptance, E, VU by TRESA at  10/17/2024 1134                      Point: Body mechanics (Resolved)       Description:   Instruct learner(s) on proper positioning and spine alignment during self-care, functional mobility activities and/or exercises.                  Learning Progress Summary            Patient Acceptance, E, VU by  at 10/18/2024 1030    Acceptance, E, VU by JW at 10/18/2024 1028    Acceptance, E,TB, VU by J at 10/18/2024 0536    Acceptance, E, VU by TRESA at 10/17/2024 1134                                      User Key       Initials Effective Dates Name Provider Type Discipline     07/13/23 -  Claudia Botello, RN Registered Nurse Nurse    TRESA 06/16/21 -  Zay Sher OT Occupational Therapist OT    WILNER 03/11/24 -  Ju Garza RN Registered Nurse Nurse                  OT Recommendation and Plan  Planned Therapy Interventions (OT): activity tolerance training, BADL retraining, functional balance retraining, occupation/activity based interventions, patient/caregiver education/training, transfer/mobility retraining  Therapy Frequency (OT): 5 times/wk  Plan of Care Review  Plan of Care Reviewed With: patient, daughter  Progress: improving  Outcome Evaluation: Patient now able to perform basic ADL and transfers SBA/RW with cues for safe positioning and adaptive techniques.  Continued OT indicated to remediate/compensate for deficits to maximize independence.     Time Calculation:   Evaluation Complexity (OT)  Review Occupational Profile/Medical/Therapy History Complexity: expanded/moderate complexity  Assessment, Occupational Performance/Identification of Deficit Complexity: 1-3 performance deficits  Clinical Decision Making Complexity (OT): problem focused assessment/low complexity  Overall Complexity of Evaluation (OT): low complexity     Time Calculation- OT       Row Name 10/18/24 1156             Time Calculation- OT    OT Received On 10/18/24  -AV      OT Goal Re-Cert Due Date 10/26/24  -AV         Timed  Charges    13953 - OT Self Care/Mgmt Minutes 25  -AV         Total Minutes    Timed Charges Total Minutes 25  -AV       Total Minutes 25  -AV                User Key  (r) = Recorded By, (t) = Taken By, (c) = Cosigned By      Initials Name Provider Type    AV Zay Sher OT Occupational Therapist                  Therapy Charges for Today       Code Description Service Date Service Provider Modifiers Qty    43522363388 HC OT SELF CARE/MGMT/TRAIN EA 15 MIN 10/17/2024 Zay Sher, OT GO 2    92260610150 HC OT EVAL LOW COMPLEXITY 3 10/17/2024 Zay Sher, OT GO 1    62283362754 HC OT SELF CARE/MGMT/TRAIN EA 15 MIN 10/18/2024 Zay Sher OT GO 2                 Zay Sher OT  10/18/2024

## 2024-10-18 NOTE — PROGRESS NOTES
[facility]     Progress Note    Patient Name: Patricia Whyte  : 1960  MRN: [id number]  Primary Care Physician:  Puja Santiago APRN  Date of admission: 10/16/2024    Subjective   Subjective     HPI:  Patient Reports doing much better this morning.  Her nerve block is worn off and she is doing well with therapy.  She is ready for discharge today.    Review of Systems   See HPI    Objective   Objective     Vitals:   Temp:  [98.1 °F (36.7 °C)-98.6 °F (37 °C)] 98.1 °F (36.7 °C)  Heart Rate:  [61-79] 73  Resp:  [16] 16  BP: (128-151)/(60-78) 151/78  Physical Exam    General: Alert, no acute distress   Chest: Unlabored breathing, cardiovascular: Regular heart rate   Musculoskeletal: Neurovascular intact extremity.  Positive pulses.  Dressing intact with minimal drainage.  Full extension.  Negative Elinor.    Result Review      WBC   Date Value Ref Range Status   10/17/2024 15.51 (H) 3.40 - 10.80 10*3/mm3 Final     RBC   Date Value Ref Range Status   10/17/2024 4.11 3.77 - 5.28 10*6/mm3 Final     Hemoglobin   Date Value Ref Range Status   10/17/2024 13.1 12.0 - 15.9 g/dL Final     Hematocrit   Date Value Ref Range Status   10/17/2024 39.8 34.0 - 46.6 % Final     MCV   Date Value Ref Range Status   10/17/2024 96.8 79.0 - 97.0 fL Final     MCH   Date Value Ref Range Status   10/17/2024 31.9 26.6 - 33.0 pg Final     MCHC   Date Value Ref Range Status   10/17/2024 32.9 31.5 - 35.7 g/dL Final     RDW   Date Value Ref Range Status   10/17/2024 12.5 12.3 - 15.4 % Final     RDW-SD   Date Value Ref Range Status   10/17/2024 44.8 37.0 - 54.0 fl Final     MPV   Date Value Ref Range Status   10/17/2024 10.7 6.0 - 12.0 fL Final     Platelets   Date Value Ref Range Status   10/17/2024 204 140 - 450 10*3/mm3 Final        Result Review:  I have personally reviewed the results from the time of this admission to 10/18/2024 07:40 EDT and agree with these findings:  [x]  Laboratory  []  Microbiology  []   Radiology  []  EKG/Telemetry   []  Cardiology/Vascular   []  Pathology  []  Old records  []  Other:      Assessment & Plan   Assessment / Plan     Brief Patient Summary:  Patricia Whyte is a 64 y.o. female who is status post left total knee arthroplasty    Active Hospital Problems:  Active Hospital Problems    Diagnosis     **Primary osteoarthritis of left knee     Osteoarthritis of left knee, unspecified osteoarthritis type      Plan: Weightbearing as tolerated with walker  Physical and Occupational Therapy  Pain control  DVT prophylaxis  Discharge planning: Discharge home with PT today       VTE Prophylaxis:  Pharmacologic & mechanical VTE prophylaxis orders are present.        CODE STATUS:   Code Status (Patient has no pulse and is not breathing): CPR (Attempt to Resuscitate)  Medical Interventions (Patient has pulse or is breathing): Full Support    Disposition:  I expect patient to be discharged later today.    Electronically signed by Jhonathan Morales MD, 10/18/24, 7:40 AM EDT.

## 2024-10-18 NOTE — PROGRESS NOTES
[facility]   Hospitalist Progress Note       Patient Name: Patricia Whyte  : 1960  MRN: [id number]  Primary Care Physician: Puja Santiago APRN  Date of admission: 10/16/2024  Today's Date: 10/18/2024  Room / Bed:   237/1  Subjective   Chief Complaint: Medical management     HPI:     Patricia Whyte is a 64 y.o. female who is being seen by hospitalist for general medical management of chronic medical issues including dyslipidemia, GERD, sleep apnea (does not have a home CPAP machine), and tobacco use.  She also has history of OA left knee and she had her left knee replaced 10/16/24 by Dr. Morales.  Current vital signs include sats 94% on 2 L, /62, RR 12, P70 8 bpm, T98.7.  Recent lab work includes A1c 6.0, creatinine 0.82, potassium 4.9, Hgb 16.3.  No recent lipid panel noted.       Interval Followup: 10/18/2024    Daughter at bedside  No palpitations chest pain or dyspnea.  Tolerating oral intake.  No abdominal pain, nausea, or vomiting.  Morning creatinine 0.8  Blood pressure 116/61  Satting well on room air  Doing well with physical therapy after recent left knee surgery        REVIEW OF SYSTEMS:   Left knee pain  Objective   Temp:  [97.3 °F (36.3 °C)-98.6 °F (37 °C)] 97.3 °F (36.3 °C)  Heart Rate:  [68-80] 80  Resp:  [16] 16  BP: (116-151)/(60-78) 116/61  PHYSICAL EXAM   CON: WN. WD. NAD.   NECK:  No thyromegaly. No stridor.   RESP:  CTA. No wheezes. No crackles.    CV:  Rhythm regular. Rate WNL. No murmur noted.  No edema.  GI:  Soft and nontender. Nondistended.    EXT: Left knee dressing intact  PSYCH:  Alert. Oriented. Normal affect and mood.  NEURO:  No dysarthria or aphasia.   SKIN: No chronic venous stasis changes or varicosities.  No cellulitis  Results from last 7 days   Lab Units 10/17/24  0419   WBC 10*3/mm3 15.51*   HEMOGLOBIN g/dL 13.1   HEMATOCRIT % 39.8   PLATELETS 10*3/mm3 204     Results from last 7 days   Lab Units 10/18/24  0413 10/17/24  0419   SODIUM  mmol/L 139 137   POTASSIUM mmol/L 4.4 4.6   CO2 mmol/L 24.9 23.9   CHLORIDE mmol/L 106 105   ANION GAP mmol/L 8.1 8.1   BUN mg/dL 13 14   CREATININE mg/dL 0.85 0.81   GLUCOSE mg/dL 106* 126*         COMPLEXITY OF DATA / DECISION MAKING     []  Moderate: One acute illness or mild exacerbation of chronic or 2 stable chronic or tx side effects   []  High:  Severe acute illness or severe exacerbation of chronic - potential for major debility / life threatening         I have personally reviewed the results from the time of this admission to 10/18/2024 12:48 EDT:  []  Laboratory:  []  Microbiology: []  Radiology:  []  Telemetry:   []  Cardiology/Vascular:  []  Pathology:  []  Prior external records:  []  Independent historian provided additional details:      []  Discussed case with specialists:    []  Independent interpretation of ECG/Imaging etc:             []  Moderate: Rx management, low risk surgery, suboptimal social situation   []  High:  Rx with close monitoring for toxicity, mod-high risk surgery, DNR decision, Comfort initiated, IV pain meds    Assessment / Plan   Assessment:     Medical management  Dyslipidemia  KAREN  GERD  Irritable bowel syndrome  Tobacco use  OA left knee  Left knee replacement 10/16/24 by Dr. Morales     Plan:     Continue home medical regimen.    Continue smoking cessation.  Recommend daily physical therapy  Pain med Rx per orthopedist  DVT prophylaxis per orthopedist  VTE Prophylaxis:  Pharmacologic & mechanical VTE prophylaxis orders are present.      CODE STATUS:      Code Status (Patient has no pulse and is not breathing): CPR (Attempt to Resuscitate)  Medical Interventions (Patient has pulse or is breathing): Full Support

## 2024-10-18 NOTE — THERAPY TREATMENT NOTE
Acute Care - Physical Therapy Treatment Note   [facility]     Patient Name: Patricia Whyte  : 1960  MRN: [id number]  Today's Date: 10/18/2024      Visit Dx:     ICD-10-CM ICD-9-CM   1. Difficulty in walking  R26.2 719.7   2. Primary osteoarthritis of left knee  M17.12 715.16   3. Decreased activities of daily living (ADL)  Z78.9 V49.89     Patient Active Problem List   Diagnosis    Primary osteoarthritis of left knee    Osteoarthritis of left knee, unspecified osteoarthritis type     Past Medical History:   Diagnosis Date    Cancer     cervical cancer-treated with chemo and radiation    GERD (gastroesophageal reflux disease)     History of fusion of cervical spine     Hyperlipidemia     IBS (irritable bowel syndrome)     Osteoarthritis     Seasonal allergies     Sleep apnea      Past Surgical History:   Procedure Laterality Date    ANTERIOR CERVICAL FUSION       SECTION      CHOLECYSTECTOMY      COLON RESECTION      HYSTERECTOMY      POSTERIOR CERVICAL FUSION      TOTAL KNEE ARTHROPLASTY Left 10/16/2024    Procedure: LEFT TOTAL KNEE ARTHROPLASTY WITH DIALLO ROBOT;  Surgeon: Jhonathan Morales MD;  Location: [facility];  Service: Robotics - Ortho;  Laterality: Left;     PT Assessment (Last 12 Hours)       PT Evaluation and Treatment       Row Name 10/18/24 1253          Physical Therapy Time and Intention    Subjective Information complains of;pain  -SM     Document Type therapy note (daily note)  -     Mode of Treatment individual therapy;group therapy;physical therapy  -     Patient Effort good  -     Comment Gait performed individually; therapuetic exercises performed in a group session with 6 participants  -       Row Name 10/18/24 1256          General Information    Patient Observations alert;cooperative;agree to therapy  -       Row Name 10/18/24 1256          Range of Motion (ROM)    Range of Motion left lower extremity  L knee ROM 0-110deg  -       Row Name 10/18/24 1259           Mobility    Extremity Weight-bearing Status left lower extremity  -     Left Lower Extremity (Weight-bearing Status) weight-bearing as tolerated (WBAT)  -       Row Name 10/18/24 1253          Transfers    Transfers sit-stand transfer;stand-sit transfer  -       Row Name 10/18/24 1253          Sit-Stand Transfer    Sit-Stand [place] (Transfers) standby assist  -     Assistive Device (Sit-Stand Transfers) walker, front-wheeled  -       Row Name 10/18/24 1253          Stand-Sit Transfer    Stand-Sit [place] (Transfers) standby assist  -     Assistive Device (Stand-Sit Transfers) walker, front-wheeled  -       Row Name 10/18/24 1253          Gait/Stairs (Locomotion)    Gait/Stairs Locomotion gait/ambulation assistive device  -     [place] Level (Gait) standby assist  -     Assistive Device (Gait) walker, front-wheeled  -     Patient was able to Ambulate yes  -     Distance in Feet (Gait) 45  45ft prior to group, 150ft following  -     Pattern (Gait) 3-point;step-through  -     Negotiation (Stairs) stairs independence  -     [place] Level (Stairs) stand by assist  -     Handrail Location (Stairs) both sides  -     Number of Steps (Stairs) 5  -SM     Ascending Technique (Stairs) step-to-step  -SM     Descending Technique (Stairs) step-to-step  -       Row Name 10/18/24 1253          Safety Issues/Impairments Affecting Functional Mobility    Impairments Affecting Function (Mobility) balance;pain;range of motion (ROM);strength  -       Row Name 10/18/24 1253          Balance    Balance Assessment standing dynamic balance  -     Dynamic Standing Balance standby assist  -     Position/Device Used, Standing Balance walker, front-wheeled  -       Row Name 10/18/24 1253          Motor Skills    Therapeutic Exercise hip;knee;ankle  -       Row Name 10/18/24 1253          Hip (Therapeutic Exercise)    Hip (Therapeutic Exercise) isometric exercises  -     Hip  Isometrics (Therapeutic Exercise) left;gluteal sets;10 repetitions;2 sets  -       Row Name 10/18/24 1253          Knee (Therapeutic Exercise)    Knee (Therapeutic Exercise) strengthening exercise;isometric exercises  -     Knee Isometrics (Therapeutic Exercise) left;quad sets;10 repetitions;2 sets  -     Knee Strengthening (Therapeutic Exercise) left;heel slides;SLR (straight leg raise);SAQ (short arc quad);LAQ (long arc quad);10 repetitions;2 sets  -       Row Name 10/18/24 1253          Ankle (Therapeutic Exercise)    Ankle (Therapeutic Exercise) AROM (active range of motion)  -     Ankle AROM (Therapeutic Exercise) left;dorsiflexion;plantarflexion;10 repetitions;2 sets  -       Row Name             Wound 10/16/24 1447 Left anterior knee    Wound - Properties Group Placement Date: 10/16/24  -SC Placement Time: 1447  -SC Side: Left  -SC Orientation: anterior  -SC Location: knee  -SC Primary Wound Type: Incision  -SC    Retired Wound - Properties Group Placement Date: 10/16/24  -SC Placement Time: 1447  -SC Side: Left  -SC Orientation: anterior  -SC Location: knee  -SC Primary Wound Type: Incision  -SC    Retired Wound - Properties Group Placement Date: 10/16/24  -SC Placement Time: 1447  -SC Side: Left  -SC Orientation: anterior  -SC Location: knee  -SC Primary Wound Type: Incision  -SC    Retired Wound - Properties Group Date first assessed: 10/16/24  -SC Time first assessed: 1447  -SC Side: Left  -SC Location: knee  -SC Primary Wound Type: Incision  -SC      Row Name             Wound 10/16/24 1448 Left proximal leg    Wound - Properties Group Placement Date: 10/16/24  -SC Placement Time: 1448  -SC Side: Left  -SC Orientation: proximal  -SC Location: leg  -SC Primary Wound Type: Puncture  -SC    Retired Wound - Properties Group Placement Date: 10/16/24  -SC Placement Time: 1448  -SC Side: Left  -SC Orientation: proximal  -SC Location: leg  -SC Primary Wound Type: Puncture  -SC    Retired Wound -  Properties Group Placement Date: 10/16/24  -SC Placement Time: 1448  -SC Side: Left  -SC Orientation: proximal  -SC Location: leg  -SC Primary Wound Type: Puncture  -SC    Retired Wound - Properties Group Date first assessed: 10/16/24  -SC Time first assessed: 1448  -SC Side: Left  -SC Location: leg  -SC Primary Wound Type: Puncture  -SC      Row Name 10/18/24 1253          Positioning and Restraints    Post Treatment Position chair  -SM     In Chair sitting;call light within reach;exit alarm on;with family/caregiver  -       Row Name 10/18/24 1253          Progress Summary (PT)    Progress Toward Functional Goals (PT) progress toward functional goals is good  -     Daily Progress Summary (PT) Pt is progressing well with their exercise program.  Will continue current plan of care.  -               User Key  (r) = Recorded By, (t) = Taken By, (c) = Cosigned By      Initials Name Provider Type    Lisa Malloy, RN Registered Nurse    Melanie Brothers PTA Physical Therapist Assistant                    Physical Therapy Education       Title: PT OT SLP Therapies (Resolved)       Topic: Physical Therapy (Resolved)       Point: Mobility training (Resolved)       Learning Progress Summary            Patient Acceptance, E, VU by JOAQUIN at 10/18/2024 1030    Acceptance, E, VU by JOAQUIN at 10/18/2024 1028    Acceptance, E,TB, VU by WILNER at 10/18/2024 0536    Acceptance, E, VU by TRESA at 10/17/2024 1134    Acceptance, E,TB, VU by MELLISA at 10/17/2024 1010                      Point: Precautions (Resolved)       Learning Progress Summary            Patient Acceptance, E, VU by JOAQUIN at 10/18/2024 1030    Acceptance, E, VU by JOAQUIN at 10/18/2024 1028    Acceptance, E,TB, VU by WILNER at 10/18/2024 0536    Acceptance, E, VU by TRESA at 10/17/2024 1134    Acceptance, E,TB, VU by MELLISA at 10/17/2024 1010                                      User Key       Initials Effective Dates Name Provider Type [place]     07/13/23 -  Claudia Botello  ALICIA, RN Registered Nurse Nurse    AV 06/16/21 -  Zay Sher OT Occupational Therapist OT    MELLISA 06/03/21 -  Vish Castro, PT Physical Therapist PT    JG 03/11/24 -  Ju Garza RN Registered Nurse Nurse                  PT Recommendation and Plan     Progress Summary (PT)  Progress Toward Functional Goals (PT): progress toward functional goals is good  Daily Progress Summary (PT): Pt is progressing well with their exercise program.  Will continue current plan of care.   Outcome Measures       Row Name 10/18/24 1300 10/17/24 1200 10/17/24 1000       How much help from another person do you currently need...    Turning from your back to your side while in flat bed without using bedrails? 4  - 3  -RH 3  -MELLISA    Moving from lying on back to sitting on the side of a flat bed without bedrails? 4  - 3  -RH 3  -MELLISA    Moving to and from a bed to a chair (including a wheelchair)? 4  - 3  -RH 3  -MELLISA    Standing up from a chair using your arms (e.g., wheelchair, bedside chair)? 4  - 3  -RH 3  -MELLISA    Climbing 3-5 steps with a railing? 4  - 2  -RH 2  -MELLISA    To walk in hospital room? 4  - 2  -RH 2  -MELLISA    AM-PAC 6 Clicks Score (PT) 24  -SM 16  -RH 16  -MELLISA       Functional Assessment    Outcome Measure Options -- -- AM-PAC 6 Clicks Basic Mobility (PT)  -MELLISA              User Key  (r) = Recorded By, (t) = Taken By, (c) = Cosigned By      Initials Name Provider Type     Bebo Shen, RITO Physical Therapist Assistant    MELLISAVish Rothman, PT Physical Therapist     Melanie Avila PTA Physical Therapist Assistant                     Time Calculation:    PT Charges       Row Name 10/18/24 1253             Time Calculation    PT Received On 10/18/24  -         Timed Charges    74305 - Gait Training Minutes  10  -SM      73864 - PT Therapeutic Activity Minutes 5  -SM         Untimed Charges    PT Group Therapy Minutes 20  -SM         Total Minutes    Timed Charges Total Minutes 15  -SM      Untimed Charges  Total Minutes 20  -SM       Total Minutes 35  -SM                User Key  (r) = Recorded By, (t) = Taken By, (c) = Cosigned By      Initials Name Provider Type    Melanie Brothers PTA Physical Therapist Assistant                      PT G-Codes  Outcome Measure Options: AM-PAC 6 Clicks Daily Activity (OT), Optimal Instrument  AM-PAC 6 Clicks Score (PT): 24  AM-PAC 6 Clicks Score (OT): 21    Melanie Avila PTA  10/18/2024

## 2024-10-18 NOTE — CONSULTS
Discharge Planning Assessment   [facility]     Patient Name: Patricia Whyte  MRN: [id number]  Today's Date: 10/18/2024    Admit Date: 10/16/2024    Pt discharging home today with outpatient therapy at [facility] in [place]. Appointment was scheduled today, however pt reports she called and rescheduled. No additional needs noted at this time.     Expected Discharge Date and Time       Expected Discharge Date Expected Discharge Time    Oct 18, 2024        ROLANDA Brown

## 2024-10-18 NOTE — PLAN OF CARE
Goal Outcome Evaluation:  Plan of Care Reviewed With: patient           Outcome Evaluation: Pt is A&O X 4, on room air, and X 1 assist. All scheduled medications given and pain managed with PRN medications. Safety checks maintained throughout shift with pt resting in chair, wheels to chair locked, and personal items and call light within reach. VSS. Pt participated in physical therapy and will follow up with [facility] in [place]. Educated pt on discharge instructions to include follow up appointments, pain management, incision care/dressing changes, signs/symptoms of infection, and if any questions or concerns arise to follow up with orthopedic office. Pt verbalized understanding.

## 2024-10-21 NOTE — THERAPY DISCHARGE NOTE
Acute Care - Occupational Therapy Discharge   [place]    Patient Name: Patricia Whyte  : 1960    MRN: [id number]                              Today's Date: 10/21/2024       Admit Date: 10/16/2024    Visit Dx:     ICD-10-CM ICD-9-CM   1. Difficulty in walking  R26.2 719.7   2. Primary osteoarthritis of left knee  M17.12 715.16   3. Decreased activities of daily living (ADL)  Z78.9 V49.89     Patient Active Problem List   Diagnosis    Primary osteoarthritis of left knee    Osteoarthritis of left knee, unspecified osteoarthritis type     Past Medical History:   Diagnosis Date    Cancer     cervical cancer-treated with chemo and radiation    GERD (gastroesophageal reflux disease)     History of fusion of cervical spine     Hyperlipidemia     IBS (irritable bowel syndrome)     Osteoarthritis     Seasonal allergies     Sleep apnea      Past Surgical History:   Procedure Laterality Date    ANTERIOR CERVICAL FUSION       SECTION      CHOLECYSTECTOMY      COLON RESECTION      HYSTERECTOMY      POSTERIOR CERVICAL FUSION      TOTAL KNEE ARTHROPLASTY Left 10/16/2024    Procedure: LEFT TOTAL KNEE ARTHROPLASTY WITH DIALLO ROBOT;  Surgeon: Jhonathan Morales MD;  Location: [facility] MAIN OR;  Service: Robotics - Ortho;  Laterality: Left;      General Information    No documentation.                  Mobility/ADL's    No documentation.                  Obj/Interventions    No documentation.                  Goals/Plan       Row Name 10/21/24 0924          Transfer Goal 1 (OT)    Activity/Assistive Device (Transfer Goal 1, OT) transfers, all;walker, rolling  -AV     [place] Level/Cues Needed (Transfer Goal 1, OT) modified independence  -AV     Time Frame (Transfer Goal 1, OT) long term goal (LTG);10 days  -AV     Progress/Outcome (Transfer Goal 1, OT) discharged from facility  -AV       Row Name 10/21/24 0924          Bathing Goal 1 (OT)    Activity/Device (Bathing Goal 1, OT) bathing skills, all;shower chair   -AV     [place] Level/Cues Needed (Bathing Goal 1, OT) modified independence  -AV     Time Frame (Bathing Goal 1, OT) long term goal (LTG);10 days  -AV     Progress/Outcomes (Bathing Goal 1, OT) discharged from facility  -AV       Row Name 10/21/24 0924          Dressing Goal 1 (OT)    Activity/Device (Dressing Goal 1, OT) dressing skills, all  -AV     [place]/Cues Needed (Dressing Goal 1, OT) modified independence  -AV     Time Frame (Dressing Goal 1, OT) long term goal (LTG);10 days  -AV     Progress/Outcome (Dressing Goal 1, OT) discharged from facility  -AV       Row Name 10/21/24 0924          Toileting Goal 1 (OT)    Activity/Device (Toileting Goal 1, OT) toileting skills, all;raised toilet seat  -AV     [place] Level/Cues Needed (Toileting Goal 1, OT) modified independence  -AV     Time Frame (Toileting Goal 1, OT) long term goal (LTG);10 days  -AV     Progress/Outcome (Toileting Goal 1, OT) discharged from [facility]  -       Row Name 10/21/24 0924          Grooming Goal 1 (OT)    Activity/Device (Grooming Goal 1, OT) grooming skills, all  -AV     [place] (Grooming Goal 1, OT) modified independence  -AV     Time Frame (Grooming Goal 1, OT) long term goal (LTG);10 days  -AV     Progress/Outcome (Grooming Goal 1, OT) discharged from facility  -AV       Row Name 10/21/24 0924          Problem Specific Goal 1 (OT)    Problem Specific Goal 1 (OT) Patient will demonstrate good standing endurance/activity tolerance needed to support ADLs.  -AV     Time Frame (Problem Specific Goal 1, OT) long term goal (LTG);10 days  -AV     Progress/Outcome (Problem Specific Goal 1, OT) discharged from facility  -               User Key  (r) = Recorded By, (t) = Taken By, (c) = Cosigned By      Initials Name Provider Type    Zay Hanson OT Occupational Therapist                   Clinical Impression    No documentation.                  Outcome Measures    No documentation.                 Occupational  Therapy Education       Title: PT OT SLP Therapies (Resolved)       Topic: Occupational Therapy (Resolved)       Point: ADL training (Resolved)       Description:   Instruct learner(s) on proper safety adaptation and remediation techniques during self care or transfers.   Instruct in proper use of assistive devices.                  Learning Progress Summary            Patient Acceptance, E, VU by JOAQUIN at 10/18/2024 1030    Acceptance, E, VU by JOAQUIN at 10/18/2024 1028    Acceptance, E,TB, VU by WILNER at 10/18/2024 0536    Acceptance, E, VU by TRESA at 10/17/2024 1134                      Point: Home exercise program (Resolved)       Description:   Instruct learner(s) on appropriate technique for monitoring, assisting and/or progressing therapeutic exercises/activities.                  Learning Progress Summary            Patient Acceptance, E, VU by JOAQUIN at 10/18/2024 1030    Acceptance, E, VU by JOAQUIN at 10/18/2024 1028    Acceptance, E,TB, VU by WILNER at 10/18/2024 0536    Acceptance, E, VU by TRESA at 10/17/2024 1134                      Point: Precautions (Resolved)       Description:   Instruct learner(s) on prescribed precautions during self-care and functional transfers.                  Learning Progress Summary            Patient Acceptance, E, VU by JOAQUIN at 10/18/2024 1030    Acceptance, E, VU by JOAQUIN at 10/18/2024 1028    Acceptance, E,TB, VU by WILNER at 10/18/2024 0536    Acceptance, E, VU by TRESA at 10/17/2024 1134                      Point: Body mechanics (Resolved)       Description:   Instruct learner(s) on proper positioning and spine alignment during self-care, functional mobility activities and/or exercises.                  Learning Progress Summary            Patient Acceptance, E, VU by JOAQUIN at 10/18/2024 1030    Acceptance, E, VU by JOAQUIN at 10/18/2024 1028    Acceptance, E,TB, VU by WILNER at 10/18/2024 0536    Acceptance, E, VU by TRESA at 10/17/2024 1134                                      User Key       Initials Effective Dates  Name Provider Type Discipline     07/13/23 -  Claudia Botello RN Registered Nurse Nurse     06/16/21 -  Zay Sher OT Occupational Therapist OT    J 03/11/24 -  Ju Garza, RN Registered Nurse Nurse                  OT Recommendation and Plan  Planned Therapy Interventions (OT): activity tolerance training, BADL retraining, functional balance retraining, occupation/activity based interventions, patient/caregiver education/training, transfer/mobility retraining  Therapy Frequency (OT): 5 times/wk  Plan of Care Review  Plan of Care Reviewed With: patient, daughter  Progress: improving  Outcome Evaluation: Patient now able to perform basic ADL and transfers SBA/RW with cues for safe positioning and adaptive techniques.  Continued OT indicated to remediate/compensate for deficits to maximize independence.  Plan of Care Reviewed With: patient, daughter  Outcome Evaluation: Patient now able to perform basic ADL and transfers SBA/RW with cues for safe positioning and adaptive techniques.  Continued OT indicated to remediate/compensate for deficits to maximize independence.     Time Calculation:   Evaluation Complexity (OT)  Review Occupational Profile/Medical/Therapy History Complexity: expanded/moderate complexity  Assessment, Occupational Performance/Identification of Deficit Complexity: 1-3 performance deficits  Clinical Decision Making Complexity (OT): problem focused assessment/low complexity  Overall Complexity of Evaluation (OT): low complexity             OT Discharge Summary  Anticipated Discharge Disposition (OT): home with assist, home with outpatient therapy services  Reason for Discharge: Discharge from facility    Zay Sher OT  10/21/2024

## 2024-10-24 DIAGNOSIS — Z96.652 AFTERCARE FOLLOWING LEFT KNEE JOINT REPLACEMENT SURGERY: Primary | ICD-10-CM

## 2024-10-24 DIAGNOSIS — Z47.1 AFTERCARE FOLLOWING LEFT KNEE JOINT REPLACEMENT SURGERY: Primary | ICD-10-CM

## 2024-10-24 RX ORDER — OXYCODONE AND ACETAMINOPHEN 7.5; 325 MG/1; MG/1
1 TABLET ORAL EVERY 4 HOURS PRN
Qty: 42 TABLET | Refills: 0 | Status: SHIPPED | OUTPATIENT
Start: 2024-10-24

## 2024-10-24 NOTE — TELEPHONE ENCOUNTER
PATIENT REQUESTING STRONGER PAIN MEDICATION, PATIENT STATES THE MEDICATION SHE IS CURRENTLY TAKING IS NOT HELPING. [facility]

## 2024-10-29 ENCOUNTER — OFFICE VISIT (OUTPATIENT)
Dept: ORTHOPEDIC SURGERY | Facility: CLINIC | Age: 64
End: 2024-10-29
Payer: COMMERCIAL

## 2024-10-29 VITALS
SYSTOLIC BLOOD PRESSURE: 132 MMHG | OXYGEN SATURATION: 95 % | BODY MASS INDEX: 31.71 KG/M2 | DIASTOLIC BLOOD PRESSURE: 83 MMHG | WEIGHT: 179 LBS | HEIGHT: 63 IN | HEART RATE: 92 BPM

## 2024-10-29 DIAGNOSIS — Z47.89 AFTERCARE FOLLOWING SURGERY OF THE MUSCULOSKELETAL SYSTEM: Primary | ICD-10-CM

## 2024-10-29 DIAGNOSIS — M25.562 LEFT KNEE PAIN, UNSPECIFIED CHRONICITY: ICD-10-CM

## 2024-10-29 PROCEDURE — 99024 POSTOP FOLLOW-UP VISIT: CPT | Performed by: PHYSICIAN ASSISTANT

## 2024-10-29 NOTE — PROGRESS NOTES
"Chief Complaint  Follow-up of the Left Knee and Suture / Staple Removal    Subjective          History of Present Illness      Patricia Whyte is a 64 y.o. female  presents to [facility] for     Patient presents with her  for 2-week postoperative evaluation of left total knee arthroplasty, 10/16/2024.  Patient states she has been compliant with EMILEE hose, she is also taking DVT prophylaxis.  She is taking pain medicine as she states she had to get up to a oxycodone due to pain control difficulty.  She states the oxycodone is helping better than the hydrocodone.  She is attending therapy at [facility].  Patient states incision has been healing well and denies redness drainage or dehiscence.  She denies calf pain.  She presents using a walker.      No Known Allergies     Social History     Socioeconomic History    Marital status:    Tobacco Use    Smoking status: Every Day     Current packs/day: 0.50     Types: Cigarettes    Smokeless tobacco: Never    Tobacco comments:     Last smoked last pm 2200   Vaping Use    Vaping status: Never Used   Substance and Sexual Activity    Alcohol use: Not Currently    Drug use: Never    Sexual activity: Defer        REVIEW OF SYSTEMS    Constitutional: Awake alert and oriented x3, no acute distress, denies fevers, chills, weight loss  Respiratory: No respiratory distress  Vascular: Brisk cap refill, Intact distal pulses, No cyanosis, compartments soft with no signs or symptoms of compartment syndrome or DVT.   Cardiovascular: Denies chest pain, shortness of breath  Skin: Denies rashes, acute skin changes  Neurologic: Denies headache, loss of consciousness  MSK: Left knee pain      Objective   Vital Signs:   /83   Pulse 92   Ht 160 cm (62.99\")   Wt 81.2 kg (179 lb)   SpO2 95%   BMI 31.72 kg/m²     Body mass index is 31.72 kg/m².    Physical Exam       Left knee: Incisions are healing well, no erythema, no drainage or " dehiscence, no signs of infection, extension -5 flexion 90, stable to varus/valgus stress stable to anterior/posterior drawer, mild generalized swelling to the knee.  Nontender calf, negative Elinor testing, neurovascularly intact      Procedures    Imaging Results (Most Recent)       Procedure Component Value Units Date/Time    XR Knee 3 View Left [[id number]] Resulted: 10/29/24 1036     Updated: 10/29/24 1036    Narrative:      View:AP/Lateral and Sunrise view(s)  Site: Left knee  Indication: Left knee pain  Study: X-rays ordered, taken in the office, and reviewed today  X-ray: Intact appearing left total knee arthroplasty, no signs of hardware   failure or loosening, no subsidence or periprosthetic fracture, good   alignment.  Retained staple on x-ray was found and removed after x-ray was   performed and helped identified the retained staple.  Comparative data: Previous studies             Result Review :   The following data was reviewed by: NICOLÁS Holloway on 10/29/2024:  Data reviewed : Radiologic studies reviewed by me with the patient              Assessment and Plan    Diagnoses and all orders for this visit:    1. Aftercare following surgery of LEFT TOTAL KNEE ARTHROPLASTY WITH DIALLO ROBOT 10/16/24 (Primary)    2. Left knee pain, unspecified chronicity  -     XR Knee 3 View Left        Reviewed x-rays with the patient and her family discussed diagnosis and treatment options with them patient and family were advised she should continue therapy for strength and range of motion.  Continue pain meds as needed she will call for refill.  We discussed continuing/finishing DVT prophylaxis.  Sutures/staples removed in office today. Steri-strips applied. Discussed incision care/hygiene. May shower, but do not submerge in water until fully healed.  Advised patient that if any concerning symptoms regarding incision appearance occur that they should call us right away, patient expressed understanding.   Follow-up in 4 weeks for recheck    Call or return if worsening symptoms.    Follow Up   Return in about 4 weeks (around 11/26/2024) for Recheck.  Patient was given instructions and counseling regarding her condition or for health maintenance advice. Please see specific information pulled into the AVS if appropriate.       EMR Dragon/Transcription disclaimer:  Part of this note may be an electronic transcription/translation of spoken language to printed text using the Dragon Dictation System

## 2024-10-31 ENCOUNTER — TELEPHONE (OUTPATIENT)
Dept: ORTHOPEDIC SURGERY | Facility: CLINIC | Age: 64
End: 2024-10-31
Payer: COMMERCIAL

## 2024-10-31 DIAGNOSIS — Z47.1 AFTERCARE FOLLOWING LEFT KNEE JOINT REPLACEMENT SURGERY: ICD-10-CM

## 2024-10-31 DIAGNOSIS — Z96.652 AFTERCARE FOLLOWING LEFT KNEE JOINT REPLACEMENT SURGERY: ICD-10-CM

## 2024-10-31 RX ORDER — OXYCODONE AND ACETAMINOPHEN 7.5; 325 MG/1; MG/1
1 TABLET ORAL EVERY 4 HOURS PRN
Qty: 42 TABLET | Refills: 0 | Status: SHIPPED | OUTPATIENT
Start: 2024-10-31

## 2024-11-11 ENCOUNTER — TELEPHONE (OUTPATIENT)
Dept: ORTHOPEDIC SURGERY | Facility: CLINIC | Age: 64
End: 2024-11-11
Payer: COMMERCIAL

## 2024-11-11 DIAGNOSIS — Z47.1 AFTERCARE FOLLOWING LEFT KNEE JOINT REPLACEMENT SURGERY: ICD-10-CM

## 2024-11-11 DIAGNOSIS — Z96.652 AFTERCARE FOLLOWING LEFT KNEE JOINT REPLACEMENT SURGERY: ICD-10-CM

## 2024-11-11 RX ORDER — OXYCODONE AND ACETAMINOPHEN 7.5; 325 MG/1; MG/1
1 TABLET ORAL EVERY 4 HOURS PRN
Qty: 42 TABLET | Refills: 0 | Status: SHIPPED | OUTPATIENT
Start: 2024-11-11

## 2024-11-25 ENCOUNTER — TELEPHONE (OUTPATIENT)
Dept: ORTHOPEDIC SURGERY | Facility: CLINIC | Age: 64
End: 2024-11-25
Payer: COMMERCIAL

## 2024-11-25 DIAGNOSIS — Z47.1 AFTERCARE FOLLOWING LEFT KNEE JOINT REPLACEMENT SURGERY: ICD-10-CM

## 2024-11-25 DIAGNOSIS — Z96.652 AFTERCARE FOLLOWING LEFT KNEE JOINT REPLACEMENT SURGERY: ICD-10-CM

## 2024-11-25 RX ORDER — OXYCODONE AND ACETAMINOPHEN 7.5; 325 MG/1; MG/1
1 TABLET ORAL EVERY 4 HOURS PRN
Qty: 42 TABLET | Refills: 0 | Status: SHIPPED | OUTPATIENT
Start: 2024-11-25

## 2024-11-26 ENCOUNTER — TELEPHONE (OUTPATIENT)
Dept: ORTHOPEDIC SURGERY | Facility: CLINIC | Age: 64
End: 2024-11-26
Payer: COMMERCIAL

## 2024-11-26 ENCOUNTER — OFFICE VISIT (OUTPATIENT)
Dept: ORTHOPEDIC SURGERY | Facility: CLINIC | Age: 64
End: 2024-11-26
Payer: COMMERCIAL

## 2024-11-26 VITALS
OXYGEN SATURATION: 95 % | HEART RATE: 100 BPM | HEIGHT: 63 IN | DIASTOLIC BLOOD PRESSURE: 81 MMHG | BODY MASS INDEX: 31.71 KG/M2 | SYSTOLIC BLOOD PRESSURE: 149 MMHG | WEIGHT: 179 LBS

## 2024-11-26 DIAGNOSIS — Z47.89 AFTERCARE FOLLOWING SURGERY OF THE MUSCULOSKELETAL SYSTEM: Primary | ICD-10-CM

## 2024-11-26 DIAGNOSIS — M24.662 ARTHROFIBROSIS OF KNEE JOINT, LEFT: ICD-10-CM

## 2024-11-26 PROCEDURE — 99024 POSTOP FOLLOW-UP VISIT: CPT | Performed by: PHYSICIAN ASSISTANT

## 2024-11-26 RX ORDER — IBUPROFEN 800 MG/1
800 TABLET, FILM COATED ORAL 3 TIMES DAILY
Qty: 90 TABLET | Refills: 0 | Status: SHIPPED | OUTPATIENT
Start: 2024-11-26

## 2024-11-26 NOTE — PROGRESS NOTES
"Chief Complaint  Follow-up of the Left Knee    Subjective          History of Present Illness      Patricia Whyte is a 64 y.o. female  presents to [facility] for     Patient presents with her daughter Amna for follow-up evaluation of left total knee arthroplasty, 10/16/2024.  Patient states her pain is controlled with pain medication she just called for refill.  She states she had swelling shortly after her surgery which limited her ability to tolerate therapy she feels like she is at least 2 weeks behind regarding her therapy recovery.  She states that therapy has gotten her to about 100 degrees of flexion with them pushing on it she states the knee is still not all the way straight.  She states that the incision has healed well she denies redness drainage or dehiscence.  She denies calf pain she finished the DVT prophylaxis.  She presents using a cane.      No Known Allergies     Social History     Socioeconomic History    Marital status:    Tobacco Use    Smoking status: Every Day     Current packs/day: 0.50     Types: Cigarettes    Smokeless tobacco: Never    Tobacco comments:     Last smoked last pm 2200   Vaping Use    Vaping status: Never Used   Substance and Sexual Activity    Alcohol use: Not Currently    Drug use: Never    Sexual activity: Defer        REVIEW OF SYSTEMS    Constitutional: Awake alert and oriented x3, no acute distress, denies fevers, chills, weight loss  Respiratory: No respiratory distress  Vascular: Brisk cap refill, Intact distal pulses, No cyanosis, compartments soft with no signs or symptoms of compartment syndrome or DVT.   Cardiovascular: Denies chest pain, shortness of breath  Skin: Denies rashes, acute skin changes  Neurologic: Denies headache, loss of consciousness  MSK: Left knee pain      Objective   Vital Signs:   /81   Pulse 100   Ht 160 cm (62.99\")   Wt 81.2 kg (179 lb)   SpO2 95%   BMI 31.72 kg/m²     Body mass index is " 31.72 kg/m².    Physical Exam       Left knee: Well-healed incision, no erythema, no ecchymosis or signs of infection.  Generalized swelling to the knee, nontender calf, negative Elinor testing, extension -5 flexion 90, stable to varus/valgus stress stable anterior/posterior drawer      Procedures    Imaging Results (Most Recent)       None             Result Review :   The following data was reviewed by: NICOLÁS Holloway on 11/26/2024:               Assessment and Plan    Diagnoses and all orders for this visit:    1. Aftercare following surgery of LEFT TOTAL KNEE ARTHROPLASTY WITH DIALLO ROBOT 10/16/24 (Primary)  -     Ambulatory Referral to Physical Therapy for Evaluation & Treatment    2. Arthrofibrosis of knee joint, left    Other orders  -     ibuprofen (ADVIL,MOTRIN) 800 MG tablet; Take 1 tablet by mouth 3 (Three) Times a Day.  Dispense: 90 tablet; Refill: 0        Discussed diagnosis and treatment options with the patient, she was advised that she could benefit from a knee manipulation she would like to avoid this for now and will work with physical therapy, Dynasplint was ordered and anti-inflammatory medication to help her knee recover and avoid a manipulation.  She was advised to follow-up in 4 weeks for recheck with x-rays.  May consider manipulation if no improvement.    Call or return if worsening symptoms.    Follow Up   Return in about 4 weeks (around 12/24/2024) for Recheck.  Patient was given instructions and counseling regarding her condition or for health maintenance advice. Please see specific information pulled into the AVS if appropriate.       EMR Dragon/Transcription disclaimer:  Part of this note may be an electronic transcription/translation of spoken language to printed text using the Dragon Dictation System

## 2024-12-04 ENCOUNTER — TELEPHONE (OUTPATIENT)
Dept: ORTHOPEDIC SURGERY | Facility: CLINIC | Age: 64
End: 2024-12-04

## 2024-12-04 NOTE — TELEPHONE ENCOUNTER
Caller: Patricia Whyte    Relationship to patient: Self    Best call back number: [phone]    Patient is needing: PATIENT STATES OFFICE ORDERED HER A KNEE BRAE AND SHE WANTS A STATUS UPDATE ON IT

## 2024-12-09 NOTE — TELEPHONE ENCOUNTER
CALLED PATIENT AND CALLED OUR REP, ANTWON.  PATIENT SHOULD HEAR SOMETHING SOON.  WAITING ON INSURANCE

## 2024-12-26 ENCOUNTER — OFFICE VISIT (OUTPATIENT)
Dept: ORTHOPEDIC SURGERY | Facility: CLINIC | Age: 64
End: 2024-12-26
Payer: COMMERCIAL

## 2024-12-26 VITALS — BODY MASS INDEX: 31.71 KG/M2 | OXYGEN SATURATION: 99 % | HEART RATE: 103 BPM | HEIGHT: 63 IN | WEIGHT: 179 LBS

## 2024-12-26 DIAGNOSIS — Z47.1 AFTERCARE FOLLOWING LEFT KNEE JOINT REPLACEMENT SURGERY: ICD-10-CM

## 2024-12-26 DIAGNOSIS — M25.562 LEFT KNEE PAIN, UNSPECIFIED CHRONICITY: Primary | ICD-10-CM

## 2024-12-26 DIAGNOSIS — Z96.652 AFTERCARE FOLLOWING LEFT KNEE JOINT REPLACEMENT SURGERY: ICD-10-CM

## 2024-12-26 PROCEDURE — 99024 POSTOP FOLLOW-UP VISIT: CPT | Performed by: ORTHOPAEDIC SURGERY

## 2024-12-26 RX ORDER — OXYCODONE AND ACETAMINOPHEN 7.5; 325 MG/1; MG/1
1 TABLET ORAL EVERY 4 HOURS PRN
Qty: 30 TABLET | Refills: 0 | Status: SHIPPED | OUTPATIENT
Start: 2024-12-26

## 2024-12-28 NOTE — PROGRESS NOTES
"Chief Complaint  Pain and Follow-up of the Left Knee     Subjective      Patricia Whyte presents to [facility] for a follow up for her left knee. She recently underwent a left knee arthroplasty with janet performed on 10/16/24. She is ten weeks post-operative. She is overall doing well today. She denies any chest pain or shortness of breath. She has been attending physical therapy and states she still has some swelling on her knee. She would like to return to work in January. She still takes pain medication occasionally in the evenings.     No Known Allergies     Social History     Socioeconomic History    Marital status:    Tobacco Use    Smoking status: Every Day     Current packs/day: 0.50     Types: Cigarettes    Smokeless tobacco: Never    Tobacco comments:     Last smoked last pm 2200   Vaping Use    Vaping status: Never Used   Substance and Sexual Activity    Alcohol use: Not Currently    Drug use: Never    Sexual activity: Defer        I reviewed the patient's chief complaint, history of present illness, review of systems, past medical history, surgical history, family history, social history, medications, and allergy list.     Review of Systems     Constitutional: Denies fevers, chills, weight loss  Cardiovascular: Denies chest pain, shortness of breath  Skin: Denies rashes, acute skin changes  Neurologic: Denies headache, loss of consciousness  MSK: left knee pain       Vital Signs:   Pulse 103   Ht 160 cm (63\")   Wt 81.2 kg (179 lb)   SpO2 99%   BMI 31.71 kg/m²            Ortho Exam    Physical Exam  General:Alert. No acute distress     Left lower extremity: mild swelling, well healed surgical incision, no redness, no signs of infection, -5 degrees extension, flexion to 110 degrees, stable to varus/valgus stress, stable to anterior/posterior drawer , distal neurovascularly intact, positive  pulses, calf soft, positive EHL, FHL, GS, and TA. Sensation intact to all " 5 nerves of the foot.     Procedures    X-Ray Report:  Left knee X-Ray  Indication: Evaluation of left knee pain   AP/Lateral and [place] view(s)  Findings: Intact appearing left total knee arthroplasty, no signs of hardware failure or loosening, no subsidence or periprosthetic fracture, good alignment.  Prior studies available for comparison: yes       Imaging Results (Most Recent)       Procedure Component Value Units Date/Time    XR Knee 3 View Left [[id number]] Resulted: 12/26/24 1046     Updated: 12/26/24 1047             Result Review :       No results found.           Assessment and Plan     Diagnoses and all orders for this visit:    1. Left knee pain, unspecified chronicity (Primary)  -     XR Knee 3 View Left    2. Aftercare following left knee joint replacement surgery  -     oxyCODONE-acetaminophen (PERCOCET) 7.5-325 MG per tablet; Take 1 tablet by mouth Every 4 (Four) Hours As Needed for Moderate Pain.  Dispense: 30 tablet; Refill: 0      The patient presents here today for a follow up for her left knee arthroplasty with janet performed on 10/16/24. X-rays were obtained in the office today and these were reviewed today.     She is overall doing well today. She will continue activities as tolerated and will plan to return to work in January.     Call or return if worsening symptoms.    Follow Up     3 months     Will obtain X-Rays of left knee at next visit.       Patient was given instructions and counseling regarding her condition or for health maintenance advice. Please see specific information pulled into the AVS if appropriate.     TransScribed for Jhonathan Morales MD by Barbara Garcia.  12/27/24   20:12 EST    I have personally performed the services described in this document as scribed by the above individual and it is both accurate and complete. Jhonathan Morales MD 12/27/24

## 2025-03-26 ENCOUNTER — TRANSCRIBE ORDERS (OUTPATIENT)
Dept: ADMINISTRATIVE | Facility: HOSPITAL | Age: 65
End: 2025-03-26
Payer: COMMERCIAL

## 2025-03-26 DIAGNOSIS — Z72.0 TOBACCO USER: Primary | ICD-10-CM

## 2025-03-27 ENCOUNTER — OFFICE VISIT (OUTPATIENT)
Dept: ORTHOPEDIC SURGERY | Facility: CLINIC | Age: 65
End: 2025-03-27
Payer: COMMERCIAL

## 2025-03-27 VITALS
DIASTOLIC BLOOD PRESSURE: 84 MMHG | HEART RATE: 99 BPM | HEIGHT: 63 IN | WEIGHT: 166 LBS | BODY MASS INDEX: 29.41 KG/M2 | OXYGEN SATURATION: 95 % | SYSTOLIC BLOOD PRESSURE: 139 MMHG

## 2025-03-27 DIAGNOSIS — M25.562 LEFT KNEE PAIN, UNSPECIFIED CHRONICITY: Primary | ICD-10-CM

## 2025-03-27 DIAGNOSIS — Z96.652 AFTERCARE FOLLOWING LEFT KNEE JOINT REPLACEMENT SURGERY: ICD-10-CM

## 2025-03-27 DIAGNOSIS — Z47.1 AFTERCARE FOLLOWING LEFT KNEE JOINT REPLACEMENT SURGERY: ICD-10-CM

## 2025-03-27 RX ORDER — IBUPROFEN 800 MG/1
800 TABLET, FILM COATED ORAL EVERY 8 HOURS PRN
Qty: 90 TABLET | Refills: 2 | Status: SHIPPED | OUTPATIENT
Start: 2025-03-27

## 2025-03-27 RX ORDER — BUPROPION HYDROCHLORIDE 150 MG/1
150 TABLET, EXTENDED RELEASE ORAL
COMMUNITY
Start: 2025-03-26 | End: 2025-05-25

## 2025-03-27 NOTE — PROGRESS NOTES
"Chief Complaint  Pain and Follow-up of the Left Knee       Subjective      Patricia Whyte presents to [facility] for a follow up for her left knee. She underwent a left knee arthroplasty with janet performed on 10/16/24. She is overall doing well and denies any new injury or falls since her last visit. She states her knee no longer jessica and is no longer walking with a limp. She has been taking Ibuprofen and home exercises.     No Known Allergies     Social History     Socioeconomic History    Marital status:    Tobacco Use    Smoking status: Every Day     Current packs/day: 0.50     Types: Cigarettes    Smokeless tobacco: Never    Tobacco comments:     Last smoked last pm 2200   Vaping Use    Vaping status: Never Used   Substance and Sexual Activity    Alcohol use: Not Currently    Drug use: Never    Sexual activity: Defer        I reviewed the patient's chief complaint, history of present illness, review of systems, past medical history, surgical history, family history, social history, medications, and allergy list.     Review of Systems     Constitutional: Denies fevers, chills, weight loss  Cardiovascular: Denies chest pain, shortness of breath  Skin: Denies rashes, acute skin changes  Neurologic: Denies headache, loss of consciousness  MSK: left knee pain       Vital Signs:   /84   Pulse 99   Ht 160 cm (63\")   Wt 75.3 kg (166 lb)   SpO2 95%   BMI 29.41 kg/m²            Ortho Exam    Physical Exam  General:Alert. No acute distress     Left lower extremity: Full extension, flexion  to 125 degrees, stable to varus/valgus stress, well healed surgicial incision, non tender to the medial joint line  and lateral joint line, calf soft, distal neurovascularly intact, positive  pulses, positive EHL, FHL, GS, and TA. Sensation intact to all 5 nerves of the foot.     Procedures    X-Ray Report:  Left knee X-Ray  Indication: Evaluation of left knee pain   AP/Lateral and " [place] view(s)  Findings: intact left knee replacement. no signs of loosening, subsidence or periprosthetic fracture  Prior studies available for comparison: yes       Imaging Results (Most Recent)       Procedure Component Value Units Date/Time    XR Knee 3 View Left [[id number]] Resulted: 03/27/25 1106     Updated: 03/27/25 1112             Result Review :       No results found.           Assessment and Plan     Diagnoses and all orders for this visit:    1. Left knee pain, unspecified chronicity (Primary)  -     XR Knee 3 View Left    2. Aftercare following left knee joint replacement surgery      The patient presents here today for a follow up for her left knee arthroplasty with janet performed on 10/16/24. X-rays were obtained in the office today and these were reviewed today.     She is overall doing well and denies any pain to her left knee today. She will continue home exercises and Ibuprofen sent into the pharmacy today.     Call or return if worsening symptoms.    Follow Up     6 months     Will obtain X-Rays of left knee at next visit.       Patient was given instructions and counseling regarding her condition or for health maintenance advice. Please see specific information pulled into the AVS if appropriate.     Scribed for Jhonathan Morales MD by Barbara Garcia.  03/27/25   11:16 EDT    I have personally performed the services described in this document as scribed by the above individual and it is both accurate and complete. Jhonathan Morales MD 03/28/25

## 2025-04-04 ENCOUNTER — HOSPITAL ENCOUNTER (OUTPATIENT)
Dept: CT IMAGING | Facility: HOSPITAL | Age: 65
Discharge: HOME | End: 2025-04-04
Admitting: NURSE PRACTITIONER
Payer: COMMERCIAL

## 2025-04-04 DIAGNOSIS — Z72.0 TOBACCO USER: ICD-10-CM

## 2025-04-04 PROCEDURE — 71271 CT THORAX LUNG CANCER SCR C-: CPT

## (undated) DEVICE — 3 BONE CEMENT MIXER: Brand: MIXEVAC

## (undated) DEVICE — STERILE POLYISOPRENE POWDER-FREE SURGICAL GLOVES: Brand: PROTEXIS

## (undated) DEVICE — DISPOSABLE TOURNIQUET CUFF 30"X4", 1-LINE, WHITE, STERILE, 1EA/PK, 10PK/CS: Brand: ASP MEDICAL

## (undated) DEVICE — TOTAL KNEE-LF: Brand: MEDLINE INDUSTRIES, INC.

## (undated) DEVICE — SHEET,DRAPE,70X85,STERILE: Brand: MEDLINE

## (undated) DEVICE — MAT FLR ABS W/BLU/LINER 56X72IN WHT

## (undated) DEVICE — GLV SURG SENSICARE PI ORTHO SZ8 LF STRL

## (undated) DEVICE — INTENDED TO SUPPORT AND MAINTAIN THE POSITION OF AN ANESTHETIZED PATIENT DURING SURGERY: Brand: HERMANTOR XL PINK KNEE POSITIONING PAD

## (undated) DEVICE — UNDERCAST PADDING: Brand: DEROYAL

## (undated) DEVICE — INTENDED FOR TISSUE SEPARATION, AND OTHER PROCEDURES THAT REQUIRE A SHARP SURGICAL BLADE TO PUNCTURE OR CUT.: Brand: BARD-PARKER ® CARBON RIB-BACK BLADES

## (undated) DEVICE — SUT ETHLN 3/0 FS1 663G

## (undated) DEVICE — GAUZE,SPONGE,4"X4",16PLY,STRL,LF,10/TRAY: Brand: MEDLINE

## (undated) DEVICE — PULLOVER TOGA, 2X LARGE: Brand: FLYTE, SURGICOOL

## (undated) DEVICE — HANDPIECE SET WITH HIGH FLOW TIP AND SUCTION TUBE: Brand: INTERPULSE

## (undated) DEVICE — SLV SCD KN/LEN ADJ EXPRSS BLENDED MD 1P/U

## (undated) DEVICE — STERILE POLYISOPRENE POWDER-FREE SURGICAL GLOVES WITH EMOLLIENT COATING: Brand: PROTEXIS

## (undated) DEVICE — PEEL-AWAY TOGA, 2X LARGE: Brand: FLYTE

## (undated) DEVICE — SYR LUERLOK 30CC

## (undated) DEVICE — DRESSING,GAUZE,XEROFORM,CURAD,1"X8",ST: Brand: CURAD

## (undated) DEVICE — SOL IRR NACL 0.9PCT 3000ML

## (undated) DEVICE — BNDG ELAS MATRX V/CLS 6INX10YD LF

## (undated) DEVICE — CVR LEG BOOTLEG F/R NOSKID 33IN

## (undated) DEVICE — DRSNG PAD ABD 8X10IN STRL

## (undated) DEVICE — BASIC SINGLE BASIN-LF: Brand: MEDLINE INDUSTRIES, INC.

## (undated) DEVICE — ANTIBACTERIAL VIOLET BRAIDED (POLYGLACTIN 910), SYNTHETIC ABSORBABLE SURGICAL SUTURE: Brand: COATED VICRYL

## (undated) DEVICE — DRP SURG U/DRP INVISISHIELD PA 48X52IN

## (undated) DEVICE — NO-SCRATCH ™ SMALL WHITNEY CURETTE ™ IS A SINGLE-USE, PLASTIC CURETTE FOR QUICKLY APPLYING, MANIPULATING AND REMOVING BONE CEMENT DURING HIP AND KNEE REPLACEMENT SURGERY. THE PLASTIC IS SOFTER THAN STEEL INSTRUMENTS, REDUCING THE RISK OF DAMAGING THE PROSTHESIS WITH METAL INSTRUMENTS.  THE CURETTE’S 6MM TIP REMOVES EXCESS CEMENT FROM REPLACEMENT HIPS AND KNEES. EASY-TO-MANEUVER, THE SMALL BLUE CURETTE LETS YOU REMOVE CEMENT FROM ALL EDGES OF THE PROSTHESIS.NO-SCRATCH WHITNEY SMALL CURETTE FEATURES:SAFER THAN STEEL- MADE OF PLASTIC - STURDY YET SOFTER THAN SURGICAL STEEL.HANDIER- EACH TOOL HAS A MOLDED-IN THUMB INDENTATION INSTANTLY ORIENTING THE TOOL.- EASIER TO MANEUVER IN HARD TO SEE PLACES.- COLOR-CODED FOR EASY IDENTIFICATION.FASTER- COMES INDIVIDUALLY PACKAGED IN STERILE, PEEL OPEN POUCH, READY TO GO.- APPLIES, MANIPULATES, OR REMOVES CEMENT WITH FINGERTIP PRECISION.ECONOMICAL- THE COST OF A SINGLE REVISION DWARFS THE COST OF A SINGLE-USE CURETTE. - DISPOSABLE – THERE’S NO NEED TO WASTE TIME REMOVING HARDENED CEMENT OR RE-STERILIZING TOOLS.- LESS EXPENSIVE TO BUY AND INVENTORY - ORDER ONLY THE TOOL YOU USE.- PACKAGED 25 INDIVIDUALLY WRAPPED TOOLS TO A CARTON FOR CONVENIENT SHELF STORAGE.: Brand: WHITNEY NO-SCRATCH CURETTE (SMALL)

## (undated) DEVICE — DRSNG SURESITE WNDW 4X4.5

## (undated) DEVICE — DRP ROBOTIC ROSA BX/20

## (undated) DEVICE — PROXIMATE RH ROTATING HEAD SKIN STAPLERS (35 WIDE) CONTAINS 35 STAINLESS STEEL STAPLES: Brand: PROXIMATE

## (undated) DEVICE — SUT VIC 2/0 CT1 36IN

## (undated) DEVICE — TOWEL,OR,DSP,ST,BLUE,STD,4/PK,20PK/CS: Brand: MEDLINE

## (undated) DEVICE — APPL CHLORAPREP HI/LITE 26ML ORNG

## (undated) DEVICE — STRYKER PERFORMANCE SERIES SAGITTAL BLADE: Brand: STRYKER PERFORMANCE SERIES

## (undated) DEVICE — SCRW HEX PERSONA FML 2.5X25MM PK/2
Type: IMPLANTABLE DEVICE | Site: KNEE | Status: NON-FUNCTIONAL
Removed: 2024-10-16

## (undated) DEVICE — PENCL ES MEGADINE EZ/CLEAN BUTN W/HOLSTR 10FT

## (undated) DEVICE — NEEDLE,18GX1.5",REG,BEVEL: Brand: MEDLINE